# Patient Record
Sex: FEMALE | Race: WHITE | NOT HISPANIC OR LATINO | Employment: FULL TIME | URBAN - METROPOLITAN AREA
[De-identification: names, ages, dates, MRNs, and addresses within clinical notes are randomized per-mention and may not be internally consistent; named-entity substitution may affect disease eponyms.]

---

## 2022-07-08 ENCOUNTER — APPOINTMENT (OUTPATIENT)
Dept: GENERAL RADIOLOGY | Facility: CLINIC | Age: 62
DRG: 247 | End: 2022-07-08
Attending: PHYSICIAN ASSISTANT
Payer: COMMERCIAL

## 2022-07-08 ENCOUNTER — APPOINTMENT (OUTPATIENT)
Dept: CARDIOLOGY | Facility: CLINIC | Age: 62
DRG: 247 | End: 2022-07-08
Attending: INTERNAL MEDICINE
Payer: COMMERCIAL

## 2022-07-08 ENCOUNTER — HOSPITAL ENCOUNTER (INPATIENT)
Facility: CLINIC | Age: 62
LOS: 1 days | Discharge: HOME | DRG: 247 | End: 2022-07-09
Attending: PHYSICIAN ASSISTANT | Admitting: INTERNAL MEDICINE
Payer: COMMERCIAL

## 2022-07-08 DIAGNOSIS — Z98.61 POSTSURGICAL PERCUTANEOUS TRANSLUMINAL CORONARY ANGIOPLASTY STATUS: ICD-10-CM

## 2022-07-08 DIAGNOSIS — I21.4 NSTEMI (NON-ST ELEVATED MYOCARDIAL INFARCTION) (H): Primary | ICD-10-CM

## 2022-07-08 LAB
ACT BLD: 186 SECONDS (ref 74–150)
ACT BLD: 263 SECONDS (ref 74–150)
ACT BLD: 284 SECONDS (ref 74–150)
ACT BLD: 305 SECONDS (ref 74–150)
ANION GAP SERPL CALCULATED.3IONS-SCNC: 9 MMOL/L (ref 3–14)
ATRIAL RATE - MUSE: 78 BPM
BUN SERPL-MCNC: 12 MG/DL (ref 7–30)
CALCIUM SERPL-MCNC: 9.5 MG/DL (ref 8.5–10.1)
CHLORIDE BLD-SCNC: 104 MMOL/L (ref 94–109)
CHOLEST SERPL-MCNC: 240 MG/DL
CO2 SERPL-SCNC: 24 MMOL/L (ref 20–32)
CREAT SERPL-MCNC: 0.59 MG/DL (ref 0.52–1.04)
D DIMER PPP FEU-MCNC: 0.36 UG/ML FEU (ref 0–0.5)
DIASTOLIC BLOOD PRESSURE - MUSE: NORMAL MMHG
ERYTHROCYTE [DISTWIDTH] IN BLOOD BY AUTOMATED COUNT: 12.5 % (ref 10–15)
GFR SERPL CREATININE-BSD FRML MDRD: >90 ML/MIN/1.73M2
GLUCOSE BLD-MCNC: 124 MG/DL (ref 70–99)
HCT VFR BLD AUTO: 39.9 % (ref 35–47)
HDLC SERPL-MCNC: 100 MG/DL
HGB BLD-MCNC: 13.4 G/DL (ref 11.7–15.7)
INTERPRETATION ECG - MUSE: NORMAL
LDLC SERPL CALC-MCNC: 112 MG/DL
LVEF ECHO: NORMAL
MCH RBC QN AUTO: 31.5 PG (ref 26.5–33)
MCHC RBC AUTO-ENTMCNC: 33.6 G/DL (ref 31.5–36.5)
MCV RBC AUTO: 94 FL (ref 78–100)
NONHDLC SERPL-MCNC: 140 MG/DL
P AXIS - MUSE: -1 DEGREES
PLATELET # BLD AUTO: 298 10E3/UL (ref 150–450)
POTASSIUM BLD-SCNC: 4.1 MMOL/L (ref 3.4–5.3)
PR INTERVAL - MUSE: 142 MS
QRS DURATION - MUSE: 84 MS
QT - MUSE: 404 MS
QTC - MUSE: 460 MS
R AXIS - MUSE: 52 DEGREES
RBC # BLD AUTO: 4.26 10E6/UL (ref 3.8–5.2)
SARS-COV-2 RNA RESP QL NAA+PROBE: NEGATIVE
SODIUM SERPL-SCNC: 137 MMOL/L (ref 133–144)
SYSTOLIC BLOOD PRESSURE - MUSE: NORMAL MMHG
T AXIS - MUSE: 148 DEGREES
TRIGL SERPL-MCNC: 138 MG/DL
TROPONIN I SERPL HS-MCNC: 99 NG/L
VENTRICULAR RATE- MUSE: 78 BPM
WBC # BLD AUTO: 7 10E3/UL (ref 4–11)

## 2022-07-08 PROCEDURE — C1874 STENT, COATED/COV W/DEL SYS: HCPCS | Performed by: INTERNAL MEDICINE

## 2022-07-08 PROCEDURE — 85379 FIBRIN DEGRADATION QUANT: CPT | Performed by: PHYSICIAN ASSISTANT

## 2022-07-08 PROCEDURE — 250N000013 HC RX MED GY IP 250 OP 250 PS 637: Performed by: STUDENT IN AN ORGANIZED HEALTH CARE EDUCATION/TRAINING PROGRAM

## 2022-07-08 PROCEDURE — 96374 THER/PROPH/DIAG INJ IV PUSH: CPT

## 2022-07-08 PROCEDURE — 255N000002 HC RX 255 OP 636: Performed by: INTERNAL MEDICINE

## 2022-07-08 PROCEDURE — 250N000011 HC RX IP 250 OP 636: Performed by: INTERNAL MEDICINE

## 2022-07-08 PROCEDURE — 80048 BASIC METABOLIC PNL TOTAL CA: CPT | Performed by: PHYSICIAN ASSISTANT

## 2022-07-08 PROCEDURE — 250N000011 HC RX IP 250 OP 636: Performed by: PHYSICIAN ASSISTANT

## 2022-07-08 PROCEDURE — 84484 ASSAY OF TROPONIN QUANT: CPT | Performed by: PHYSICIAN ASSISTANT

## 2022-07-08 PROCEDURE — C9606 PERC D-E COR REVASC W AMI S: HCPCS | Performed by: INTERNAL MEDICINE

## 2022-07-08 PROCEDURE — 258N000003 HC RX IP 258 OP 636: Performed by: STUDENT IN AN ORGANIZED HEALTH CARE EDUCATION/TRAINING PROGRAM

## 2022-07-08 PROCEDURE — 250N000011 HC RX IP 250 OP 636: Performed by: STUDENT IN AN ORGANIZED HEALTH CARE EDUCATION/TRAINING PROGRAM

## 2022-07-08 PROCEDURE — 85027 COMPLETE CBC AUTOMATED: CPT | Performed by: PHYSICIAN ASSISTANT

## 2022-07-08 PROCEDURE — U0003 INFECTIOUS AGENT DETECTION BY NUCLEIC ACID (DNA OR RNA); SEVERE ACUTE RESPIRATORY SYNDROME CORONAVIRUS 2 (SARS-COV-2) (CORONAVIRUS DISEASE [COVID-19]), AMPLIFIED PROBE TECHNIQUE, MAKING USE OF HIGH THROUGHPUT TECHNOLOGIES AS DESCRIBED BY CMS-2020-01-R: HCPCS | Performed by: PHYSICIAN ASSISTANT

## 2022-07-08 PROCEDURE — C1725 CATH, TRANSLUMIN NON-LASER: HCPCS | Performed by: INTERNAL MEDICINE

## 2022-07-08 PROCEDURE — 93454 CORONARY ARTERY ANGIO S&I: CPT | Performed by: INTERNAL MEDICINE

## 2022-07-08 PROCEDURE — 99152 MOD SED SAME PHYS/QHP 5/>YRS: CPT | Performed by: INTERNAL MEDICINE

## 2022-07-08 PROCEDURE — 80061 LIPID PANEL: CPT | Performed by: STUDENT IN AN ORGANIZED HEALTH CARE EDUCATION/TRAINING PROGRAM

## 2022-07-08 PROCEDURE — 99221 1ST HOSP IP/OBS SF/LOW 40: CPT | Mod: 25 | Performed by: INTERNAL MEDICINE

## 2022-07-08 PROCEDURE — C1887 CATHETER, GUIDING: HCPCS | Performed by: INTERNAL MEDICINE

## 2022-07-08 PROCEDURE — B241ZZ3 ULTRASONOGRAPHY OF MULTIPLE CORONARY ARTERIES, INTRAVASCULAR: ICD-10-PCS | Performed by: INTERNAL MEDICINE

## 2022-07-08 PROCEDURE — 71046 X-RAY EXAM CHEST 2 VIEWS: CPT

## 2022-07-08 PROCEDURE — 272N000001 HC OR GENERAL SUPPLY STERILE: Performed by: INTERNAL MEDICINE

## 2022-07-08 PROCEDURE — 250N000013 HC RX MED GY IP 250 OP 250 PS 637: Performed by: INTERNAL MEDICINE

## 2022-07-08 PROCEDURE — 250N000013 HC RX MED GY IP 250 OP 250 PS 637: Performed by: PHYSICIAN ASSISTANT

## 2022-07-08 PROCEDURE — B2111ZZ FLUOROSCOPY OF MULTIPLE CORONARY ARTERIES USING LOW OSMOLAR CONTRAST: ICD-10-PCS | Performed by: INTERNAL MEDICINE

## 2022-07-08 PROCEDURE — 027034Z DILATION OF CORONARY ARTERY, ONE ARTERY WITH DRUG-ELUTING INTRALUMINAL DEVICE, PERCUTANEOUS APPROACH: ICD-10-PCS | Performed by: INTERNAL MEDICINE

## 2022-07-08 PROCEDURE — 999N000208 ECHOCARDIOGRAM COMPLETE

## 2022-07-08 PROCEDURE — 210N000002 HC R&B HEART CARE

## 2022-07-08 PROCEDURE — 99223 1ST HOSP IP/OBS HIGH 75: CPT | Mod: AI | Performed by: INTERNAL MEDICINE

## 2022-07-08 PROCEDURE — C1769 GUIDE WIRE: HCPCS | Performed by: INTERNAL MEDICINE

## 2022-07-08 PROCEDURE — C1760 CLOSURE DEV, VASC: HCPCS | Performed by: INTERNAL MEDICINE

## 2022-07-08 PROCEDURE — 93005 ELECTROCARDIOGRAM TRACING: CPT

## 2022-07-08 PROCEDURE — 92978 ENDOLUMINL IVUS OCT C 1ST: CPT | Performed by: INTERNAL MEDICINE

## 2022-07-08 PROCEDURE — 36415 COLL VENOUS BLD VENIPUNCTURE: CPT | Performed by: PHYSICIAN ASSISTANT

## 2022-07-08 PROCEDURE — 99153 MOD SED SAME PHYS/QHP EA: CPT | Performed by: INTERNAL MEDICINE

## 2022-07-08 PROCEDURE — 93010 ELECTROCARDIOGRAM REPORT: CPT | Performed by: INTERNAL MEDICINE

## 2022-07-08 PROCEDURE — 85347 COAGULATION TIME ACTIVATED: CPT

## 2022-07-08 PROCEDURE — 93306 TTE W/DOPPLER COMPLETE: CPT | Mod: 26 | Performed by: INTERNAL MEDICINE

## 2022-07-08 PROCEDURE — 99285 EMERGENCY DEPT VISIT HI MDM: CPT

## 2022-07-08 PROCEDURE — 250N000009 HC RX 250: Performed by: INTERNAL MEDICINE

## 2022-07-08 PROCEDURE — C9803 HOPD COVID-19 SPEC COLLECT: HCPCS

## 2022-07-08 DEVICE — CLOSURE ANGIOSEAL 6FR 610130: Type: IMPLANTABLE DEVICE | Status: FUNCTIONAL

## 2022-07-08 DEVICE — STENT CORONARY DES SYNERGY XD MR US 3.00X20MM H7493941820300: Type: IMPLANTABLE DEVICE | Status: FUNCTIONAL

## 2022-07-08 RX ORDER — GUANFACINE 1 MG/1
1 TABLET, EXTENDED RELEASE ORAL EVERY EVENING
COMMUNITY
Start: 2022-06-30

## 2022-07-08 RX ORDER — DEXTROAMPHETAMINE SACCHARATE, AMPHETAMINE ASPARTATE MONOHYDRATE, DEXTROAMPHETAMINE SULFATE AND AMPHETAMINE SULFATE 7.5; 7.5; 7.5; 7.5 MG/1; MG/1; MG/1; MG/1
1 CAPSULE, EXTENDED RELEASE ORAL EVERY MORNING
COMMUNITY
Start: 2022-06-16

## 2022-07-08 RX ORDER — LORAZEPAM 2 MG/ML
.5-1 INJECTION INTRAMUSCULAR EVERY 8 HOURS PRN
Status: DISCONTINUED | OUTPATIENT
Start: 2022-07-08 | End: 2022-07-09

## 2022-07-08 RX ORDER — OXYCODONE HYDROCHLORIDE 5 MG/1
5 TABLET ORAL EVERY 4 HOURS PRN
Status: DISCONTINUED | OUTPATIENT
Start: 2022-07-08 | End: 2022-07-09 | Stop reason: HOSPADM

## 2022-07-08 RX ORDER — ONDANSETRON 4 MG/1
4 TABLET, ORALLY DISINTEGRATING ORAL EVERY 6 HOURS PRN
Status: DISCONTINUED | OUTPATIENT
Start: 2022-07-08 | End: 2022-07-09

## 2022-07-08 RX ORDER — ATROPINE SULFATE 0.1 MG/ML
0.5 INJECTION INTRAVENOUS
Status: ACTIVE | OUTPATIENT
Start: 2022-07-08 | End: 2022-07-08

## 2022-07-08 RX ORDER — HEPARIN SODIUM 1000 [USP'U]/ML
INJECTION, SOLUTION INTRAVENOUS; SUBCUTANEOUS
Status: DISCONTINUED | OUTPATIENT
Start: 2022-07-08 | End: 2022-07-08 | Stop reason: HOSPADM

## 2022-07-08 RX ORDER — SODIUM CHLORIDE 9 MG/ML
INJECTION, SOLUTION INTRAVENOUS CONTINUOUS
Status: ACTIVE | OUTPATIENT
Start: 2022-07-08 | End: 2022-07-08

## 2022-07-08 RX ORDER — POTASSIUM CHLORIDE 1500 MG/1
20 TABLET, EXTENDED RELEASE ORAL
Status: CANCELLED | OUTPATIENT
Start: 2022-07-08

## 2022-07-08 RX ORDER — ASPIRIN 81 MG/1
81 TABLET ORAL DAILY
Status: DISCONTINUED | OUTPATIENT
Start: 2022-07-09 | End: 2022-07-09 | Stop reason: HOSPADM

## 2022-07-08 RX ORDER — OXYCODONE HYDROCHLORIDE 5 MG/1
10 TABLET ORAL EVERY 4 HOURS PRN
Status: DISCONTINUED | OUTPATIENT
Start: 2022-07-08 | End: 2022-07-09 | Stop reason: HOSPADM

## 2022-07-08 RX ORDER — ASPIRIN 81 MG/1
243 TABLET, CHEWABLE ORAL ONCE
Status: CANCELLED | OUTPATIENT
Start: 2022-07-08

## 2022-07-08 RX ORDER — ASPIRIN 325 MG
325 TABLET ORAL ONCE
Status: CANCELLED | OUTPATIENT
Start: 2022-07-08 | End: 2022-07-08

## 2022-07-08 RX ORDER — NALOXONE HYDROCHLORIDE 0.4 MG/ML
0.4 INJECTION, SOLUTION INTRAMUSCULAR; INTRAVENOUS; SUBCUTANEOUS
Status: ACTIVE | OUTPATIENT
Start: 2022-07-08 | End: 2022-07-08

## 2022-07-08 RX ORDER — LORAZEPAM 0.5 MG/1
0.5 TABLET ORAL
Status: CANCELLED | OUTPATIENT
Start: 2022-07-08

## 2022-07-08 RX ORDER — ASPIRIN 81 MG/1
81 TABLET, CHEWABLE ORAL DAILY
Qty: 30 TABLET | Refills: 3 | Status: SHIPPED | OUTPATIENT
Start: 2022-07-09 | End: 2022-07-09

## 2022-07-08 RX ORDER — FLUMAZENIL 0.1 MG/ML
0.2 INJECTION, SOLUTION INTRAVENOUS
Status: ACTIVE | OUTPATIENT
Start: 2022-07-08 | End: 2022-07-08

## 2022-07-08 RX ORDER — SODIUM CHLORIDE 9 MG/ML
INJECTION, SOLUTION INTRAVENOUS CONTINUOUS
Status: CANCELLED | OUTPATIENT
Start: 2022-07-08

## 2022-07-08 RX ORDER — HYDRALAZINE HYDROCHLORIDE 20 MG/ML
10 INJECTION INTRAMUSCULAR; INTRAVENOUS EVERY 4 HOURS PRN
Status: DISCONTINUED | OUTPATIENT
Start: 2022-07-08 | End: 2022-07-09 | Stop reason: HOSPADM

## 2022-07-08 RX ORDER — MOMETASONE FUROATE 1 MG/ML
SOLUTION TOPICAL DAILY PRN
COMMUNITY
Start: 2021-11-17

## 2022-07-08 RX ORDER — NITROGLYCERIN 20 MG/100ML
10-200 INJECTION INTRAVENOUS CONTINUOUS
Status: DISCONTINUED | OUTPATIENT
Start: 2022-07-08 | End: 2022-07-08 | Stop reason: CLARIF

## 2022-07-08 RX ORDER — ASPIRIN 81 MG/1
81 TABLET, CHEWABLE ORAL ONCE
Status: DISCONTINUED | OUTPATIENT
Start: 2022-07-08 | End: 2022-07-08

## 2022-07-08 RX ORDER — FENTANYL CITRATE 50 UG/ML
25 INJECTION, SOLUTION INTRAMUSCULAR; INTRAVENOUS
Status: DISCONTINUED | OUTPATIENT
Start: 2022-07-08 | End: 2022-07-09 | Stop reason: HOSPADM

## 2022-07-08 RX ORDER — ASPIRIN 81 MG/1
324 TABLET, CHEWABLE ORAL ONCE
Status: COMPLETED | OUTPATIENT
Start: 2022-07-08 | End: 2022-07-08

## 2022-07-08 RX ORDER — HEPARIN SODIUM 10000 [USP'U]/100ML
0-5000 INJECTION, SOLUTION INTRAVENOUS CONTINUOUS
Status: DISCONTINUED | OUTPATIENT
Start: 2022-07-08 | End: 2022-07-08

## 2022-07-08 RX ORDER — ZALEPLON 10 MG/1
1-2 CAPSULE ORAL
COMMUNITY
Start: 2022-06-30

## 2022-07-08 RX ORDER — GUANFACINE 2 MG/1
1 TABLET, EXTENDED RELEASE ORAL EVERY EVENING
COMMUNITY
Start: 2022-06-30

## 2022-07-08 RX ORDER — ONDANSETRON 2 MG/ML
4 INJECTION INTRAMUSCULAR; INTRAVENOUS EVERY 6 HOURS PRN
Status: DISCONTINUED | OUTPATIENT
Start: 2022-07-08 | End: 2022-07-09

## 2022-07-08 RX ORDER — ATORVASTATIN CALCIUM 80 MG/1
80 TABLET, FILM COATED ORAL DAILY
Qty: 90 TABLET | Refills: 3 | Status: SHIPPED | OUTPATIENT
Start: 2022-07-08 | End: 2022-07-09

## 2022-07-08 RX ORDER — ATORVASTATIN CALCIUM 80 MG/1
80 TABLET, FILM COATED ORAL EVERY EVENING
Status: DISCONTINUED | OUTPATIENT
Start: 2022-07-08 | End: 2022-07-09 | Stop reason: HOSPADM

## 2022-07-08 RX ORDER — NITROGLYCERIN 0.4 MG/1
0.4 TABLET SUBLINGUAL EVERY 5 MIN PRN
Status: DISCONTINUED | OUTPATIENT
Start: 2022-07-08 | End: 2022-07-09

## 2022-07-08 RX ORDER — LIDOCAINE 40 MG/G
CREAM TOPICAL
Status: CANCELLED | OUTPATIENT
Start: 2022-07-08

## 2022-07-08 RX ORDER — NALOXONE HYDROCHLORIDE 0.4 MG/ML
0.2 INJECTION, SOLUTION INTRAMUSCULAR; INTRAVENOUS; SUBCUTANEOUS
Status: ACTIVE | OUTPATIENT
Start: 2022-07-08 | End: 2022-07-08

## 2022-07-08 RX ORDER — LORAZEPAM 2 MG/ML
0.5 INJECTION INTRAMUSCULAR
Status: CANCELLED | OUTPATIENT
Start: 2022-07-08

## 2022-07-08 RX ORDER — NITROGLYCERIN 5 MG/ML
VIAL (ML) INTRAVENOUS
Status: DISCONTINUED | OUTPATIENT
Start: 2022-07-08 | End: 2022-07-08 | Stop reason: HOSPADM

## 2022-07-08 RX ORDER — DEXTROAMPHETAMINE SACCHARATE, AMPHETAMINE ASPARTATE, DEXTROAMPHETAMINE SULFATE AND AMPHETAMINE SULFATE 2.5; 2.5; 2.5; 2.5 MG/1; MG/1; MG/1; MG/1
1 TABLET ORAL DAILY PRN
COMMUNITY
Start: 2022-05-24

## 2022-07-08 RX ORDER — CARVEDILOL 3.12 MG/1
3.12 TABLET ORAL 2 TIMES DAILY WITH MEALS
Status: DISCONTINUED | OUTPATIENT
Start: 2022-07-08 | End: 2022-07-09

## 2022-07-08 RX ORDER — NITROGLYCERIN 0.4 MG/1
0.4 TABLET SUBLINGUAL EVERY 5 MIN PRN
Status: DISCONTINUED | OUTPATIENT
Start: 2022-07-08 | End: 2022-07-08

## 2022-07-08 RX ORDER — FENTANYL CITRATE 50 UG/ML
INJECTION, SOLUTION INTRAMUSCULAR; INTRAVENOUS
Status: DISCONTINUED | OUTPATIENT
Start: 2022-07-08 | End: 2022-07-08 | Stop reason: HOSPADM

## 2022-07-08 RX ORDER — ACETAMINOPHEN 325 MG/1
650 TABLET ORAL EVERY 4 HOURS PRN
Status: DISCONTINUED | OUTPATIENT
Start: 2022-07-08 | End: 2022-07-09

## 2022-07-08 RX ADMIN — HEPARIN SODIUM 800 UNITS/HR: 10000 INJECTION, SOLUTION INTRAVENOUS at 11:25

## 2022-07-08 RX ADMIN — ATORVASTATIN CALCIUM 80 MG: 80 TABLET, FILM COATED ORAL at 20:01

## 2022-07-08 RX ADMIN — NITROGLYCERIN 10 MCG/MIN: 20 INJECTION INTRAVENOUS at 21:19

## 2022-07-08 RX ADMIN — HUMAN ALBUMIN MICROSPHERES AND PERFLUTREN 9 ML: 10; .22 INJECTION, SOLUTION INTRAVENOUS at 16:32

## 2022-07-08 RX ADMIN — LORAZEPAM 0.5 MG: 2 INJECTION INTRAMUSCULAR; INTRAVENOUS at 23:44

## 2022-07-08 RX ADMIN — CARVEDILOL 3.12 MG: 3.12 TABLET, FILM COATED ORAL at 12:05

## 2022-07-08 RX ADMIN — SODIUM CHLORIDE: 9 INJECTION, SOLUTION INTRAVENOUS at 14:46

## 2022-07-08 RX ADMIN — ASPIRIN 81 MG CHEWABLE TABLET 324 MG: 81 TABLET CHEWABLE at 10:30

## 2022-07-08 RX ADMIN — OXYCODONE HYDROCHLORIDE 10 MG: 5 TABLET ORAL at 20:47

## 2022-07-08 RX ADMIN — HYDRALAZINE HYDROCHLORIDE 10 MG: 20 INJECTION INTRAMUSCULAR; INTRAVENOUS at 20:00

## 2022-07-08 RX ADMIN — HYDRALAZINE HYDROCHLORIDE 10 MG: 20 INJECTION INTRAMUSCULAR; INTRAVENOUS at 16:14

## 2022-07-08 RX ADMIN — TICAGRELOR 90 MG: 90 TABLET ORAL at 22:29

## 2022-07-08 RX ADMIN — NITROGLYCERIN 0.4 MG: 0.4 TABLET SUBLINGUAL at 11:22

## 2022-07-08 RX ADMIN — NITROGLYCERIN 0.4 MG: 0.4 TABLET SUBLINGUAL at 11:27

## 2022-07-08 RX ADMIN — OXYCODONE HYDROCHLORIDE 5 MG: 5 TABLET ORAL at 16:32

## 2022-07-08 RX ADMIN — CARVEDILOL 3.12 MG: 3.12 TABLET, FILM COATED ORAL at 17:40

## 2022-07-08 ASSESSMENT — ACTIVITIES OF DAILY LIVING (ADL)
ADLS_ACUITY_SCORE: 39
ADLS_ACUITY_SCORE: 35
ADLS_ACUITY_SCORE: 39

## 2022-07-08 ASSESSMENT — ENCOUNTER SYMPTOMS
RHINORRHEA: 0
NECK STIFFNESS: 1
FEVER: 0
COUGH: 0
VOMITING: 0
HEADACHES: 0
NECK PAIN: 1
SORE THROAT: 0
DIARRHEA: 0
NAUSEA: 0
ABDOMINAL PAIN: 0

## 2022-07-08 NOTE — PROVIDER NOTIFICATION
MD Notification    Notified Person: MD    Notified Person Name: Dr. Govea    Notification Date/Time: 7-8-22 1745    Notification Interaction: text page with [employer] messaging    Purpose of Notification: BP's 200/100.  pt tearful,anxious, states lots of home family stress with ill brother. Asking for any PRN anxiety meds    Orders Received: no anti anxiety at this time, was planning to increase coreg and give PRN hydralazine but after discussing with Dr. Garnica will use Nitroglycerin gtt for now and reassess tomorrow.     Comments:

## 2022-07-08 NOTE — Clinical Note
Stent deployed in the proximal left anterior descending. Max pressure = 14 gini. Total duration = 20 seconds.

## 2022-07-08 NOTE — PROVIDER NOTIFICATION
"MD Notification    Notified Person: MD    Notified Person Name: Dr. Garnica     Notification Date/Time: 7-8-22 1735    Notification Interaction: text page    Purpose of Notification: BP's 200/100's.  Given hydralazine PRN x1 with minimal change.  Pt states \"feels hard to take deep breath\" sats ok on room air.  Asking for any BP meds or PRN's    Orders Received: Will start IV nitroglycerin with goal of systolic < 150.    Comments:      "

## 2022-07-08 NOTE — H&P
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    Assessment & Plan       NSTEMI (non-ST elevated myocardial infarction) (H) (7/8/2022):   As above, patient presented with 3-day intermittent chest pain in the left chest area, radiating to the arm, jaw and subscapular area, also noticed it with exertion when she walked yesterday, associated with some diaphoresis and nausea.  She thinks the longest episode was 5 minutes yesterday.  Due to worsening of symptoms patient presented to ER this morning where she required administration of baby aspirin, 2 doses of nitroglycerin and EKG was concerning for T wave inversions in leads III and 4..  Troponin was elevated around 99 and cardiology was consulted in the emergency room.  Untreated hyperlipidemia  Possible undiagnosed essential hypertension versus whitecoat hypertension: In the past patient has been a started on metoprolol which she was not able to tolerate due to worsening depression    -- Admit patient to the Cardiology medical floor with telemetry for close monitoring   -- Start Patient on Aspirin 325 mg q. Daily  -- Heparin bolus and IV heparin infusion  -- Make patient NPO, Discussed the case with cardiology for tentative angiography and further evaluations.appreciate cardiology help   -- Started low dose of Carvedilol 3.125 mg BID as patient was not able to tolerate the Metoprolol in the past   -- Start patient on Lipitor 80 mg at bedtime   -- Daily weight  -- Strict Is and Os.  -- 2 D ECHO of the  "heart ASAP  -- Serial enzyme to be checked   -- Fasting Lipid profile in AM    Migraine headache : stable   -- Hold Adderrall while patient is in the hospital    Dysthymic disorder  Insomnia  -- Patient has been taking Sonata 1 to 2 capsules by mouth as needed, will order Ambien 5 mg as needed while patient is in the hospital.    Clinically Significant Risk Factors Present on Admission                 # Overweight: Estimated body mass index is 26.57 kg/m  as calculated from the following:    Height as of this encounter: 1.6 m (5' 3\").    Weight as of this encounter: 68 kg (150 lb).        Diet: NPO for Medical/Clinical Reasons Except for: Meds    Kirby Catheter: Not present  DVT Prophylaxis: Patient is currently on heparin infusion  Code Status: Full Code    Disposition: Expecting patient to stay for 1-2 nights, further discharge recommendations after angiogram results    The patient's care was discussed with the Bedside Nurse, Patient and ER Team.    Kylah Govea MD, MD,FACP    ----------------------------------------------------------------------------------------------------------------------    Primary Care Physician   [facility]    Chief Complaint   Chest pain , radiating to left arm , jaw and back intermittently for last three days     History is obtained from the patient    Patient Active Problem List   Diagnosis     NSTEMI (non-ST elevated myocardial infarction) (H)       History of Present Illness   Inez Benjamin is a 62 year old female , works as [employer] RN ,with past medical history significant for untreated dyslipidemia, possible whitecoat hypertension or undiagnosed essential hypertension who presented to emergency room now with 3 days of intermittent chest discomfort radiating to her left arm, jaw and subscapular area.  Patient also noticed associated diaphoresis and some dyspnea on exertion.  Patient is also undergoing significant stress due to family situation in the last few " days as her brother was involved in a motor vehicle accident and is currently admitted to [facility] in critical status.  Patient noticed development of chest pain when she walked to the hospital to visit her brother yesterday also.  Patient presented to emergency room for further evaluation and management this morning as her symptoms continue to get worse.    In the emergency department, patient temperature was 98.2, pulse was 75, blood pressure was 167/89, respiratory rate was 18 and she was saturating 100% on room air.  Her labs showed sodium of 137, creatinine of 0.59, WBC count of 7.0 D-dimer was 0.36 her troponin was elevated around 19 9 her COVID-19 PCR came back negative.  Chest x-ray was negative for any acute pathology.  Patient was given aspirin 81 mg and then 2 doses of nitroglycerin which helped her chest pain resolution.  Cardiology was also contacted from emergency room, highly appreciate their help, cardiology has discussed regarding concern for ACS due to the findings on EKG indicative of Wellens sign concerning for LAD territory abnormality.  Patient will be kept n.p.o., patient was also started on heparin infusion in the emergency room in the past patient had some more depression with metoprolol so cardiology is recommending starting patient on carvedilol.  Discussed with patient regarding probable need for echocardiogram, patient was in agreement with the plan and will be admitted for further evaluation and management.    Review of Systems   CONSTITUTIONAL:  positive for fatigue and generalized weakness for last three days.  EYES:  negative for blurred vision and visual disturbance  HEENT:  negative for hoarseness and voice change  RESPIRATORY:  Negative for cough with sputum, dyspnea and wheezing   CARDIOVASCULAR: As per [place]   GASTROINTESTINAL: Negative for abd pain, nausea , vomiting ,constipation and abdominal pain  GENITOURINARY: Negative for burning /urgency and  frequency.  HEMATOLOGIC/LYMPHATIC:  negative  ALLERGIC/IMMUNOLOGIC:  negative for drug reactions  ENDOCRINE:  negative for diabetic symptoms including polyuria, polydipsia and weight loss  MUSCULOSKELETAL:  negative for arthralgias  NEUROLOGICAL:  negative  BEHAVIOR/PSYCH: History of depression and dysthymia     Past Medical History    I have reviewed this patient's medical history and updated it with pertinent information if needed.   Patient has a history of dyslipidemia, migraine headache and dysthymic disorder    Past Surgical History   I have reviewed this patient's surgical history and updated it with pertinent information if needed.  Appendicectomy and     Prior to Admission Medications   Prior to Admission Medications   Prescriptions Last Dose Informant Patient Reported? Taking?   NONFORMULARY 2022 at AM Pharmacy Yes Yes   Sig: Apply 0.25 g topically every morning Custom compound: 4 mg BiEst (80 Estriol:20 Estradiol) and 2 mg testosterone per gram of compound.   (1 mg BiEst + 0.5 mg testosterone per dose)   NONFORMULARY 2022 at HS Pharmacy Yes Yes   Sig: Take 1 capsule by mouth At Bedtime Custom compound: 50 mg progesterone per capsule and olive oil   amphetamine-dextroamphetamine (ADDERALL XR) 30 MG 24 hr capsule 2022 at AM Self Yes Yes   Sig: Take 1 capsule by mouth every morning   amphetamine-dextroamphetamine (ADDERALL) 10 MG tablet 2022 at AM Self Yes Yes   Sig: Take 1 tablet by mouth daily as needed   guanFACINE (INTUNIV) 1 MG TB24 24 hr tablet 2022 at PM Self Yes Yes   Sig: Take 1 tablet by mouth every evening Take in addition to 2 mg tablet   guanFACINE (INTUNIV) 2 MG TB24 24 hr tablet 2022 at PM Self Yes Yes   Sig: Take 1 tablet by mouth every evening Take in addition to 1 mg tablet   mometasone (ELOCON) 0.1 % external solution 2022 at Unknown time Self Yes Yes   Sig: Apply topically daily as needed   zaleplon (SONATA) 10 MG capsule 2022 at HS Self Yes Yes    Sig: Take 1-2 capsules by mouth nightly as needed      Facility-Administered Medications: None     Allergies   Allergies   Allergen Reactions     Lisinopril Cough     Metoprolol Other (See Comments)       Social History   I have reviewed this patient's social history and updated it with pertinent information if needed.  Patient is started smoking at the age of 13 but quit at the age of 48, has not been smoking for the last 15 years.  Patient consumes 2 drinks of wine on a daily basis.  Patient is denying any illicit drug use.  Patient currently works as a ER RN at [employer].    Family History   Both the parents are , mother passed away from aneurysm rupture, no history of coronary artery disease in parents or siblings, currently brother is admitted to [facility] [facility] in critical situation due to being involved in motor vehicle accident    Physical Exam   Temp: 98.2  F (36.8  C) Temp src: Oral BP: (!) 169/87 Pulse: 80   Resp: 16 SpO2: 100 % O2 Device: None (Room air)    Vital Signs with Ranges  Temp:  [98.2  F (36.8  C)] 98.2  F (36.8  C)  Pulse:  [] 80  Resp:  [15-18] 16  BP: (155-190)/() 169/87  SpO2:  [98 %-100 %] 100 %  150 lbs 0 oz    Constitutional: Awake, alert,oriented to time, place and person , cooperative, no apparent distress.Pleasent and cooperative , slightly anxious  Eyes: Conjunctiva and pupils examined and normal.  HEENT: Moist mucous membranes, normal dentition.  Respiratory: Clear to auscultation bilaterally, no crackles or wheezing.  Cardiovascular: Regular rate and rhythm, normal S1 and S2, and no murmur noted.  GI: Soft, non-distended, non-tender, normal bowel sounds.  Lymph/Hematologic: No anterior cervical or supraclavicular adenopathy.  Skin: No rashes, no cyanosis, no edema.  Musculoskeletal: No joint swelling, erythema or tenderness.  Neurologic: Cranial nerves 2-12 intact, normal strength and sensation.  Psychiatric: Alert, oriented to person, place and  time, no obvious anxiety or depression.    Data   Data reviewed today:  I personally reviewed EKG which is showing normal sinus rhythm with T wave abnormality in lead III and 4    Recent Labs   Lab 07/08/22  1005   WBC 7.0   HGB 13.4   MCV 94         POTASSIUM 4.1   CHLORIDE 104   CO2 24   BUN 12   CR 0.59   ANIONGAP 9   CELESTINA 9.5   *       Imaging:  Recent Results (from the past 24 hour(s))   Chest XR,  PA & LAT    Narrative    CHEST TWO VIEWS  7/8/2022 11:23 AM     HISTORY:  Chest pain.    COMPARISON: None.      Impression    IMPRESSION: Negative chest. Lungs clear.    BANDAR NAYLOR MD         SYSTEM ID:  [id number]

## 2022-07-08 NOTE — Clinical Note
The first balloon was inserted into the left anterior descending.Max pressure = 16 gini. Total duration = 30 seconds.     Max pressure = 16 gini. Total duration = 30 seconds.

## 2022-07-08 NOTE — CONSULTS
[facility]    Cardiology Consultation     Date of Admission:  7/8/2022    Assessment & Plan   Inez Benjamin is a 62 year old female who was admitted on 7/8/2022.    A very pleasant 62-year-old female with CAD risk factors untreated dyslipidemia with LDL more than 160 in the past who is now admitted with the 3 days of intermittent chest discomfort feels like a deep ache tightness wrapping around her scapula along with EKG changes and elevated high since her troponin all consistent with acute coronary syndrome.  Somewhat remarkable is that there is somewhat discrepancy between the EKG changes and troponin elevation especially with 3 days of intermittent symptoms.  This could also possibly be stress cardiomyopathy however that is a diagnosis of exclusion and at this time the most concerning etiology is acute coronary syndrome with Wellens sign on EKG indicating possibility of ischemia in LAD territory.  I did discuss this with patient.  We discussed options of medical management versus medical management plus coronary angiogram.  Risk benefits of core angiogram with risk including but not limited risk of stroke, MI, death, need for PRBC transfusion, emergent bypass surgery were discussed with patient.  Patient understands the rational of cholangiogram, the risk involved and the alternative and wishes to proceed with it.  I also called Cath Lab to get the code angiogram done on an urgent basis.  Patient will continue NPO.  Continue aspirin, heparin.  I recommend adding Lipitor 80 mg daily.  She has allergy listed to metoprolol and lisinopril.  With lisinopril she had cough.  With metoprolol she had some mood depression.  Patient is willing to try an alternative beta-blocker.  I recommend adding carvedilol 3.125 mg twice daily.  This will also help bring down her blood pressure too.  I also recommend echocardiogram.  No bleeding issues or anticipated surgery.  No contraindication for  long-term dual antiplatelet if indicated.    1.  Chest discomfort with the EKG changes and elevated troponin concerning for acute coronary syndrome.  Symptoms are going intermittently for last 2 to 3 days.  Recurrent episode of angina.  EKG concerning for Wellens sign indicating possibility of LAD territory ischemia.  Other differential includes stress cardiomyopathy as noted above.  To be noted patient has been under a lot of stress recently as his brother had a major motor vehicle accident and is currently admitted at [facility].  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  2.  Elevated blood pressure.  This needs to be observed.  She has does not have history of hypertension.  3.  Dyslipidemia with LDL above 160 in the past.      Recommendations  Coronary angiogram with possible revascularization.  I have contacted Cath Lab so that the angiogram can be done on an urgent basis.  Echocardiogram  Continue aspirin, heparin  Add Lipitor 80 mg daily, carvedilol 3.125 mg twice daily  Continue n.p.o.    Discussed in detail with patient      Jamel Garnica MD, MD    Primary Care Physician   [facility]    Reason for Consult   Reason for consult: I was asked by Dr. Govea to evaluate this patient for non-STEMI.    History of Present Illness   Inez Benjamin is a 62 year old female who presents with intermittent chest discomfort for last 2 to 3 days.  She describes this as achy sensation deep inside moderate to severe intensity sometimes radiating to the scapula and upper chest.  She has been under a lot of stress recently.  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  Her brother recently had a major motor vehicle accident and is currently recovering at [employer] [facility] [employer].  EKG shows what appears to be Wellens sign T wave inversion in anterior precordial leads.  High since her troponin is 99.  No mediastinal widening on chest x-ray.  She does not have any bleeding issues or anticipated surgery.  She  has history of allergy listed to metoprolol and lisinopril with metoprolol causing some mood changes depression and lisinopril causing cough.  Lipid panel in Care Everywhere shows LDL was more than 160.  She had recurrent episode of chest discomfort while in the ER and was given 2 sublingual nitroglycerin with complete resolution of symptoms.    Patient Active Problem List   Diagnosis     NSTEMI (non-ST elevated myocardial infarction) (H)       Past Medical History   I have reviewed this patient's medical history and updated it with pertinent information if needed.   No past medical history on file.    Past Surgical History   I have reviewed this patient's surgical history and updated it with pertinent information if needed.  No past surgical history on file.    Prior to Admission Medications   Prior to Admission Medications   Prescriptions Last Dose Informant Patient Reported? Taking?   NONFORMULARY 7/7/2022 at AM Pharmacy Yes Yes   Sig: Apply 0.25 g topically every morning Custom compound: 4 mg BiEst (80 Estriol:20 Estradiol) and 2 mg testosterone per gram of compound.   (1 mg BiEst + 0.5 mg testosterone per dose)   NONFORMULARY 7/7/2022 at HS Pharmacy Yes Yes   Sig: Take 1 capsule by mouth At Bedtime Custom compound: 50 mg progesterone per capsule and olive oil   amphetamine-dextroamphetamine (ADDERALL XR) 30 MG 24 hr capsule 7/8/2022 at AM Self Yes Yes   Sig: Take 1 capsule by mouth every morning   amphetamine-dextroamphetamine (ADDERALL) 10 MG tablet 7/6/2022 at AM Self Yes Yes   Sig: Take 1 tablet by mouth daily as needed   guanFACINE (INTUNIV) 1 MG TB24 24 hr tablet 7/7/2022 at PM Self Yes Yes   Sig: Take 1 tablet by mouth every evening Take in addition to 2 mg tablet   guanFACINE (INTUNIV) 2 MG TB24 24 hr tablet 7/7/2022 at PM Self Yes Yes   Sig: Take 1 tablet by mouth every evening Take in addition to 1 mg tablet   mometasone (ELOCON) 0.1 % external solution 7/7/2022 at Unknown time Self Yes Yes   Sig:  "Apply topically daily as needed   zaleplon (SONATA) 10 MG capsule 7/7/2022 at HS Self Yes Yes   Sig: Take 1-2 capsules by mouth nightly as needed      Facility-Administered Medications: None     Current Facility-Administered Medications   Medication Dose Route Frequency     atorvastatin  80 mg Oral QPM     carvedilol  3.125 mg Oral BID w/meals     Current Facility-Administered Medications   Medication Last Rate     heparin 800 Units/hr (07/08/22 1125)     Allergies   Allergies   Allergen Reactions     Lisinopril Cough     Metoprolol Other (See Comments)       Social History        Family History   No family history of known premature coronary disease in first-degree relatives    Review of Systems   The comprehensive 10 point Review of Systems is negative other than noted in the HPI or here.     Physical Exam   Vital Signs with Ranges  Temp:  [98.2  F (36.8  C)] 98.2  F (36.8  C)  Pulse:  [] 92  Resp:  [18] 18  BP: (155-190)/() 155/92  SpO2:  [98 %-100 %] 100 %  Wt Readings from Last 4 Encounters:   07/08/22 68 kg (150 lb)     No intake/output data recorded.      Vitals: BP (!) 155/92   Pulse 92   Temp 98.2  F (36.8  C) (Oral)   Resp 18   Ht 1.6 m (5' 3\")   Wt 68 kg (150 lb)   SpO2 100%   BMI 26.57 kg/m    General patient appears comfortable  Neck normal JVP next cardiovascular system S1-S2 normal no murmur rub or gallop, equal pulses bilateral upper extremities  Neurological alert, oriented  GI system abdomen soft nontender  Extremities no edema  HEENT no pallor  Psych normal affect  Skin no obvious rash    No lab results found in last 7 days.    Invalid input(s): TROPONINIES    Recent Labs   Lab 07/08/22  1005   WBC 7.0   HGB 13.4   MCV 94         POTASSIUM 4.1   CHLORIDE 104   CO2 24   BUN 12   CR 0.59   GFRESTIMATED >90   ANIONGAP 9   CELESTINA 9.5   *     No results for input(s): CHOL, HDL, LDL, TRIG, CHOLHDLRATIO in the last 29134 hours.  Recent Labs   Lab 07/08/22  1005   WBC " "7.0   HGB 13.4   HCT 39.9   MCV 94        No results for input(s): PH, PHV, PO2, PO2V, SAT, PCO2, PCO2V, HCO3, HCO3V in the last 168 hours.  No results for input(s): NTBNPI, NTBNP in the last 168 hours.  Recent Labs   Lab 07/08/22  1029   DD 0.36     No results for input(s): SED, CRP in the last 168 hours.  Recent Labs   Lab 07/08/22  1005        No results for input(s): TSH in the last 168 hours.  No results for input(s): COLOR, APPEARANCE, URINEGLC, URINEBILI, URINEKETONE, SG, UBLD, URINEPH, PROTEIN, UROBILINOGEN, NITRITE, LEUKEST, RBCU, WBCU in the last 168 hours.    Imaging:  Recent Results (from the past 48 hour(s))   Chest XR,  PA & LAT    Narrative    CHEST TWO VIEWS  7/8/2022 11:23 AM     HISTORY:  Chest pain.    COMPARISON: None.      Impression    IMPRESSION: Negative chest. Lungs clear.    BANDAR NAYLOR MD         SYSTEM ID:  [id number]       Echo:  No results found for this or any previous visit (from the past 4320 hour(s)).    Clinically Significant Risk Factors Present on Admission                 # Overweight: Estimated body mass index is 26.57 kg/m  as calculated from the following:    Height as of this encounter: 1.6 m (5' 3\").    Weight as of this encounter: 68 kg (150 lb).                                                    "

## 2022-07-08 NOTE — ED PROVIDER NOTES
ED ATTENDING PHYSICIAN NOTE:   I evaluated this patient in conjunction with Santhosh Stout PA-C  I have participated in the care of the patient and personally performed key elements of the history, exam, and medical decision making.      HPI:   Inez Benjamin is a 62 year old female with chest pain and neck pain for the past three days. She explains that the chest pain radiates around her chest and into her neck causing neck stiffness, but symptoms typically resolve after a few hours. She denies any headache, runny nose, sore throat, cough, fever, abdominal pain, nausea, vomiting, or diarrhea.      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 She noticed it in her front and it radiates to her back and neck. Sometimes she feels light headed with this. Activity sometimes makes it worse. H does have hypertension and high cholesterol without treatment. There is no first degree family history of heart disease and she is not a smoker. She has been under stress thought due to her brother having a significant injury a few weeks ago. She was seen by Santhosh SUAREZ, her EKG demonstrated some nonspecific ST elevation in leads P1-3 of approximately 0.5 mm with some deep T wave inversion across the precordial leads. Her troponin has come back minimally elevated and her pain is still intermittent. She has received aspirin and is receiving nitroglycerin started on heparin. She is getting a chest X ray and will be admitted. We will also contact cardiology to determine weather or not she would be a good candidate to go to the cath lab at this time. Impression is NSTEMI and irritation.     DIAGNOSIS:     ICD-10-CM    1.  Postsurgical percutaneous transluminal coronary angioplasty status  Z98.61 CARDIAC REHAB REFERRAL     aspirin (ASA) 81 MG chewable tablet     atorvastatin (LIPITOR) 80 MG tablet   2. NSTEMI (non-ST elevated myocardial infarction) (H)  I21.4 Case Request Cath Lab: Coronary Angiogram, Percutaneous Coronary Intervention     Case Request Cath Lab: Coronary Angiogram, Percutaneous Coronary Intervention     Cardiac Catheterization     Cardiac Catheterization     CARDIAC REHAB REFERRAL     aspirin (ASA) 81 MG chewable tablet     atorvastatin (LIPITOR) 80 MG tablet            DISPOSITION:   admit       7/8/2022  [facility] EMERGENCY DEPT       Ha Baker MD  07/08/22 0231

## 2022-07-08 NOTE — PROGRESS NOTES
A/Ox4. SBP 150s-200s, other VS stable on RA. Tele NSR. SBA in room. Angio 7/8 with intervention, site WDL. C/o tenderness at site. Off at bedrest 1715. Pt periodically tearful and anxious, expressed difficulty to take deep breath, sats well on RA. Started on nitrodrip to manage BP.

## 2022-07-08 NOTE — ED PROVIDER NOTES
History   Chief Complaint:  Chest Pain       HPI   Inez Benjamin is a 62 year old female with history of depression who presents with chest pain and neck pain for the past three days. She explains that the chest pain radiates around her chest and into her neck causing neck stiffness, but symptoms typically resolve after a few hours. She reports she believes stress increases frequency of episodes. She explains today's episode started about an hour ago and has not resolved. She reports she thought it was muscular pain and tried rolling it out which did not help. Also reports using ibuprofen and Tums which have not helped alleviate pain.   Denies headache, runny nose, sore throat, cough, fever, abdominal pain, nausea, vomiting, diarrhea. Endorses diaphoresis at baseline. Denies recent surgeries, travel, history of blood clots, cancer, or heart problems.        Review of Systems   Constitutional: Negative for fever.   HENT: Negative for rhinorrhea and sore throat.    Respiratory: Negative for cough.    Cardiovascular: Positive for chest pain.   Gastrointestinal: Negative for abdominal pain, diarrhea, nausea and vomiting.   Musculoskeletal: Positive for neck pain and neck stiffness.   Neurological: Negative for headaches.   All other systems reviewed and are negative.    Allergies:  Lisinopril  Metoprolol    Medications:  Adderall  Sonata    Past Medical History:     Dysmenorrhea  Depression   Rosacea  Dysthymic disorder    Social History:  The patient presents to the ED alone.  PCP: Rebecca Cano at [facility].    Physical Exam     Patient Vitals for the past 24 hrs:   BP Temp Temp src Pulse Resp SpO2 Height Weight   07/08/22 1356 -- -- -- -- 18 -- -- --   07/08/22 1346 -- -- -- -- 19 -- -- --   07/08/22 1335 -- -- -- -- 17 -- -- --   07/08/22 1325 -- -- -- -- 17 -- -- --   07/08/22 1315 -- -- -- -- 17 -- -- --   07/08/22 1304 -- -- -- -- 16 -- -- --   07/08/22 1253 -- -- -- -- 15 -- -- --   07/08/22 1243 -- -- -- --  "16 -- -- --   07/08/22 1231 -- -- -- -- 15 -- -- --   07/08/22 1200 (!) 169/87 -- -- 80 -- 100 % -- --   07/08/22 1155 (!) 161/86 -- -- 85 -- 100 % -- --   07/08/22 1150 (!) 177/101 -- -- 88 -- 100 % -- --   07/08/22 1145 (!) 155/92 -- -- 92 -- 100 % -- --   07/08/22 1140 (!) 161/89 -- -- 89 -- 100 % -- --   07/08/22 1135 (!) 164/98 -- -- 104 -- 98 % -- --   07/08/22 1130 (!) 187/115 -- -- 100 -- 100 % -- --   07/08/22 1125 (!) 190/90 -- -- 78 -- -- -- --   07/08/22 1120 (!) 188/113 -- -- -- -- -- -- --   07/08/22 0956 (!) 167/89 98.2  F (36.8  C) Oral 75 18 100 % 1.6 m (5' 3\") 68 kg (150 lb)       Physical Exam  General: Well appearing, pleasant female, resting on exam bed  HEENT: No evidence of trauma.  Conjunctive are clear.  Extraocular eye movements intact.  Neck range of motion intact.  Nose and throat clear.  Respiratory: Good breath sounds bilaterally  Cardiovascular: Normal rate and rhythm   Gastrointestinal: Soft, nontender.   Musculoskeletal: Atraumatic  Skin: Exposed skin clear.  Neurologic: Alert.  Psych:  Patient is cooperative, with normal affect.      Emergency Department Course   ECG  ECG taken at 1003, ECG read at 1005  Normal sinus rhythm  Anterior infarct, age undetermined  T wave abnormality, consider lateral ischemia  Abnormal ECG  Rate 78 bpm. FL interval 142 ms. QRS duration 84 ms. QT/QTc 404/460 ms. P-R-T axes -1 52 148.    Imaging:  Chest XR,  PA & LAT   Final Result   IMPRESSION: Negative chest. Lungs clear.      BANDAR NAYLOR MD            SYSTEM ID:  [id number]      Cardiac Catheterization    (Results Pending)   Echocardiogram Complete    (Results Pending)     Report per radiology    Laboratory:  Labs Ordered and Resulted from Time of ED Arrival to Time of ED Departure   BASIC METABOLIC PANEL - Abnormal       Result Value    Sodium 137      Potassium 4.1      Chloride 104      Carbon Dioxide (CO2) 24      Anion Gap 9      Urea Nitrogen 12      Creatinine 0.59      Calcium 9.5      " Glucose 124 (*)     GFR Estimate >90     TROPONIN I - Abnormal    Troponin I High Sensitivity 99 (*)    CBC WITH PLATELETS - Normal    WBC Count 7.0      RBC Count 4.26      Hemoglobin 13.4      Hematocrit 39.9      MCV 94      MCH 31.5      MCHC 33.6      RDW 12.5      Platelet Count 298     D DIMER QUANTITATIVE - Normal    D-Dimer Quantitative 0.36          Emergency Department Course:         Reviewed:  I reviewed nursing notes, vitals, past medical history and Care Everywhere    Assessments:  1009 I obtained history and examined the patient as noted above.   1105 I rechecked the patient and explained findings.     Consults:  1110 I consulted with Dr. Aguiar, cardiology, regarding the patient.   1130 I consulted with Dr. Govea, hospitalist, regarding the patient.     Interventions:  Medications   nitroGLYcerin (NITROSTAT) sublingual tablet 0.4 mg (0.4 mg Sublingual Given 7/8/22 1127)   heparin 25,000 units in 0.45% NaCl 250 mL ANTICOAGULANT infusion (800 Units/hr Intravenous New Bag 7/8/22 1125)   atorvastatin (LIPITOR) tablet 80 mg (has no administration in time range)   carvedilol (COREG) tablet 3.125 mg (3.125 mg Oral Given 7/8/22 1205)   fentaNYL (PF) (SUBLIMAZE) injection (50 mcg Intravenous Given 7/8/22 1236)   midazolam (VERSED) injection (0.5 mg Intravenous Given 7/8/22 1343)   lidocaine 1 % (10 mLs Infiltration Given 7/8/22 1236)   heparin (porcine) injection (3,000 Units Intravenous Given 7/8/22 1328)   nitroGLYcerin in D5W injection (200 mcg INTRACORONARY Given 7/8/22 1335)   ticagrelor (BRILINTA) tablet (180 mg Oral Given 7/8/22 1331)   aspirin (ASA) chewable tablet 324 mg (324 mg Oral Given 7/8/22 1030)   heparin loading dose for LOW INTENSITY TREATMENT * Give BEFORE starting heparin infusion (4,100 Units Intravenous Given 7/8/22 1124)      Disposition:  The patient was admitted to the hospital under the care of Dr. Govea, hospitalist.     Impression & Plan     Medical Decision Making:  Inez YANG  Sourav is a 62 year old female presents emergency room today for evaluation of chest pain intermittently for the past 3 days.  See HPI.  She is hypertensive.  She has significant T wave changes.  See EKG.  She has a reassuring exam and is in no acute distress.  She was given aspirin.  Her troponin came back elevated at 99.  Labs otherwise reassuring.  Chest radiograph unremarkable.  At this point, I staffed the case with Dr. Reynoso.  Nitroglycerin and heparin are ordered.  Cardiology was consulted.  She will be admitted to the hospital service and kept n.p.o.  She was stable at time of admission.  Unlikely dissection or PE.    Diagnosis:    ICD-10-CM    1. NSTEMI (non-ST elevated myocardial infarction) (H)  I21.4 Case Request Cath Lab: Coronary Angiogram, Percutaneous Coronary Intervention     Case Request Cath Lab: Coronary Angiogram, Percutaneous Coronary Intervention     Cardiac Catheterization     Cardiac Catheterization       Scribe Disclosure:  Gris MCCLENDON, am serving as a scribe at 10:09 AM on 7/8/2022 to document services personally performed by Santhosh Stout PA-C based on my observations and the provider's statements to me.          Santhosh Stout PA-C  07/08/22 1400

## 2022-07-08 NOTE — PHARMACY-ADMISSION MEDICATION HISTORY
Pharmacy Medication History  Admission medication history interview status for the 7/8/2022  admission is complete. See EPIC admission navigator for prior to admission medications     Location of Interview: Patient room  Medication history sources: Patient, Surescripts, Pharmacy [facility] and Care Everywhere    Significant changes made to the medication list:  1. Added 2 custom compounds, zaleplon, guanfacine x2, Adderall, Adderall XR, mometasone    In the past week, patient estimated taking medication this percent of the time: greater than 90%    Additional medication history information:   1. Contents of custom compounds were confirmed by compounding pharmacy.   2. Patient reports she is not taking clobetasol.  3. Patient takes Adderall XR 30 mg (scheduled) and PRN Adderall 10 mg. She also takes guanfacine ER 1 mg and ER 2 mg.      Medication reconciliation completed by provider prior to medication history? No    Time spent in this activity: 20 min    Prior to Admission medications    Medication Sig Last Dose Taking? Auth Provider Long Term End Date   amphetamine-dextroamphetamine (ADDERALL XR) 30 MG 24 hr capsule Take 1 capsule by mouth every morning 7/8/2022 at AM Yes Unknown, Entered By History     amphetamine-dextroamphetamine (ADDERALL) 10 MG tablet Take 1 tablet by mouth daily as needed 7/6/2022 at AM Yes Unknown, Entered By History     guanFACINE (INTUNIV) 1 MG TB24 24 hr tablet Take 1 tablet by mouth every evening Take in addition to 2 mg tablet 7/7/2022 at PM Yes Unknown, Entered By History     guanFACINE (INTUNIV) 2 MG TB24 24 hr tablet Take 1 tablet by mouth every evening Take in addition to 1 mg tablet 7/7/2022 at PM Yes Unknown, Entered By History     mometasone (ELOCON) 0.1 % external solution Apply topically daily as needed 7/7/2022 at Unknown time Yes Unknown, Entered By History     NONFORMULARY Apply 0.25 g topically every morning Custom compound: 4 mg BiEst (80 Estriol:20 Estradiol) and  2 mg testosterone per gram of compound.   (1 mg BiEst + 0.5 mg testosterone per dose) 7/7/2022 at AM Yes Unknown, Entered By History     NONFORMULARY Take 1 capsule by mouth At Bedtime Custom compound: 50 mg progesterone per capsule and olive oil 7/7/2022 at HS Yes Unknown, Entered By History     zaleplon (SONATA) 10 MG capsule Take 1-2 capsules by mouth nightly as needed 7/7/2022 at HS Yes Unknown, Entered By History         The information provided in this note is only as accurate as the sources available at the time of update(s)

## 2022-07-09 ENCOUNTER — APPOINTMENT (OUTPATIENT)
Dept: OCCUPATIONAL THERAPY | Facility: CLINIC | Age: 62
DRG: 247 | End: 2022-07-09
Attending: STUDENT IN AN ORGANIZED HEALTH CARE EDUCATION/TRAINING PROGRAM
Payer: COMMERCIAL

## 2022-07-09 VITALS
BODY MASS INDEX: 26.22 KG/M2 | HEIGHT: 63 IN | TEMPERATURE: 97.8 F | HEART RATE: 80 BPM | WEIGHT: 148 LBS | RESPIRATION RATE: 16 BRPM | OXYGEN SATURATION: 100 % | SYSTOLIC BLOOD PRESSURE: 136 MMHG | DIASTOLIC BLOOD PRESSURE: 80 MMHG

## 2022-07-09 LAB
ANION GAP SERPL CALCULATED.3IONS-SCNC: 6 MMOL/L (ref 3–14)
BUN SERPL-MCNC: 9 MG/DL (ref 7–30)
CALCIUM SERPL-MCNC: 8.7 MG/DL (ref 8.5–10.1)
CHLORIDE BLD-SCNC: 106 MMOL/L (ref 94–109)
CO2 SERPL-SCNC: 27 MMOL/L (ref 20–32)
CREAT SERPL-MCNC: 0.53 MG/DL (ref 0.52–1.04)
GFR SERPL CREATININE-BSD FRML MDRD: >90 ML/MIN/1.73M2
GLUCOSE BLD-MCNC: 101 MG/DL (ref 70–99)
MAGNESIUM SERPL-MCNC: 2.4 MG/DL (ref 1.6–2.3)
POTASSIUM BLD-SCNC: 3.6 MMOL/L (ref 3.4–5.3)
POTASSIUM BLD-SCNC: 3.8 MMOL/L (ref 3.4–5.3)
SODIUM SERPL-SCNC: 139 MMOL/L (ref 133–144)

## 2022-07-09 PROCEDURE — 93010 ELECTROCARDIOGRAM REPORT: CPT | Performed by: INTERNAL MEDICINE

## 2022-07-09 PROCEDURE — 84132 ASSAY OF SERUM POTASSIUM: CPT | Performed by: INTERNAL MEDICINE

## 2022-07-09 PROCEDURE — 36415 COLL VENOUS BLD VENIPUNCTURE: CPT | Performed by: INTERNAL MEDICINE

## 2022-07-09 PROCEDURE — 250N000013 HC RX MED GY IP 250 OP 250 PS 637: Performed by: STUDENT IN AN ORGANIZED HEALTH CARE EDUCATION/TRAINING PROGRAM

## 2022-07-09 PROCEDURE — 99239 HOSP IP/OBS DSCHRG MGMT >30: CPT | Performed by: INTERNAL MEDICINE

## 2022-07-09 PROCEDURE — 97110 THERAPEUTIC EXERCISES: CPT | Mod: GO

## 2022-07-09 PROCEDURE — 82310 ASSAY OF CALCIUM: CPT | Performed by: STUDENT IN AN ORGANIZED HEALTH CARE EDUCATION/TRAINING PROGRAM

## 2022-07-09 PROCEDURE — 99232 SBSQ HOSP IP/OBS MODERATE 35: CPT | Performed by: INTERNAL MEDICINE

## 2022-07-09 PROCEDURE — 250N000013 HC RX MED GY IP 250 OP 250 PS 637: Performed by: INTERNAL MEDICINE

## 2022-07-09 PROCEDURE — 97165 OT EVAL LOW COMPLEX 30 MIN: CPT | Mod: GO

## 2022-07-09 PROCEDURE — 97535 SELF CARE MNGMENT TRAINING: CPT | Mod: GO

## 2022-07-09 PROCEDURE — 83735 ASSAY OF MAGNESIUM: CPT | Performed by: INTERNAL MEDICINE

## 2022-07-09 PROCEDURE — 93005 ELECTROCARDIOGRAM TRACING: CPT

## 2022-07-09 PROCEDURE — 36415 COLL VENOUS BLD VENIPUNCTURE: CPT | Performed by: STUDENT IN AN ORGANIZED HEALTH CARE EDUCATION/TRAINING PROGRAM

## 2022-07-09 RX ORDER — LISINOPRIL 5 MG/1
5 TABLET ORAL DAILY
Status: DISCONTINUED | OUTPATIENT
Start: 2022-07-09 | End: 2022-07-09

## 2022-07-09 RX ORDER — PROCHLORPERAZINE 25 MG
25 SUPPOSITORY, RECTAL RECTAL EVERY 12 HOURS PRN
Status: DISCONTINUED | OUTPATIENT
Start: 2022-07-09 | End: 2022-07-09 | Stop reason: HOSPADM

## 2022-07-09 RX ORDER — NALOXONE HYDROCHLORIDE 0.4 MG/ML
0.4 INJECTION, SOLUTION INTRAMUSCULAR; INTRAVENOUS; SUBCUTANEOUS
Status: DISCONTINUED | OUTPATIENT
Start: 2022-07-09 | End: 2022-07-09 | Stop reason: HOSPADM

## 2022-07-09 RX ORDER — CARVEDILOL 6.25 MG/1
6.25 TABLET ORAL 2 TIMES DAILY WITH MEALS
Status: DISCONTINUED | OUTPATIENT
Start: 2022-07-09 | End: 2022-07-09 | Stop reason: HOSPADM

## 2022-07-09 RX ORDER — LOSARTAN POTASSIUM 25 MG/1
25 TABLET ORAL DAILY
Qty: 30 TABLET | Refills: 0 | Status: SHIPPED | OUTPATIENT
Start: 2022-07-09

## 2022-07-09 RX ORDER — LIDOCAINE 40 MG/G
CREAM TOPICAL
Status: DISCONTINUED | OUTPATIENT
Start: 2022-07-09 | End: 2022-07-09 | Stop reason: HOSPADM

## 2022-07-09 RX ORDER — ONDANSETRON 2 MG/ML
4 INJECTION INTRAMUSCULAR; INTRAVENOUS EVERY 6 HOURS PRN
Status: DISCONTINUED | OUTPATIENT
Start: 2022-07-09 | End: 2022-07-09 | Stop reason: HOSPADM

## 2022-07-09 RX ORDER — ONDANSETRON 4 MG/1
4 TABLET, ORALLY DISINTEGRATING ORAL EVERY 6 HOURS PRN
Status: DISCONTINUED | OUTPATIENT
Start: 2022-07-09 | End: 2022-07-09 | Stop reason: HOSPADM

## 2022-07-09 RX ORDER — OXYCODONE HYDROCHLORIDE 5 MG/1
5 TABLET ORAL EVERY 6 HOURS PRN
Status: DISCONTINUED | OUTPATIENT
Start: 2022-07-09 | End: 2022-07-09

## 2022-07-09 RX ORDER — NITROGLYCERIN 0.4 MG/1
0.4 TABLET SUBLINGUAL EVERY 5 MIN PRN
Status: DISCONTINUED | OUTPATIENT
Start: 2022-07-09 | End: 2022-07-09 | Stop reason: HOSPADM

## 2022-07-09 RX ORDER — LOSARTAN POTASSIUM 25 MG/1
25 TABLET ORAL DAILY
Status: DISCONTINUED | OUTPATIENT
Start: 2022-07-09 | End: 2022-07-09 | Stop reason: HOSPADM

## 2022-07-09 RX ORDER — ZOLPIDEM TARTRATE 5 MG/1
5 TABLET ORAL
Status: DISCONTINUED | OUTPATIENT
Start: 2022-07-09 | End: 2022-07-09 | Stop reason: HOSPADM

## 2022-07-09 RX ORDER — ACETAMINOPHEN 650 MG/1
650 SUPPOSITORY RECTAL EVERY 6 HOURS PRN
Status: DISCONTINUED | OUTPATIENT
Start: 2022-07-09 | End: 2022-07-09 | Stop reason: HOSPADM

## 2022-07-09 RX ORDER — NALOXONE HYDROCHLORIDE 0.4 MG/ML
0.2 INJECTION, SOLUTION INTRAMUSCULAR; INTRAVENOUS; SUBCUTANEOUS
Status: DISCONTINUED | OUTPATIENT
Start: 2022-07-09 | End: 2022-07-09 | Stop reason: HOSPADM

## 2022-07-09 RX ORDER — CALCIUM CARBONATE 500 MG/1
1000 TABLET, CHEWABLE ORAL 4 TIMES DAILY PRN
Status: DISCONTINUED | OUTPATIENT
Start: 2022-07-09 | End: 2022-07-09 | Stop reason: HOSPADM

## 2022-07-09 RX ORDER — SODIUM CHLORIDE 9 MG/ML
INJECTION, SOLUTION INTRAVENOUS CONTINUOUS
Status: DISCONTINUED | OUTPATIENT
Start: 2022-07-09 | End: 2022-07-09

## 2022-07-09 RX ORDER — ACETAMINOPHEN 325 MG/1
650 TABLET ORAL EVERY 6 HOURS PRN
Status: DISCONTINUED | OUTPATIENT
Start: 2022-07-09 | End: 2022-07-09 | Stop reason: HOSPADM

## 2022-07-09 RX ORDER — PROCHLORPERAZINE MALEATE 5 MG
10 TABLET ORAL EVERY 6 HOURS PRN
Status: DISCONTINUED | OUTPATIENT
Start: 2022-07-09 | End: 2022-07-09 | Stop reason: HOSPADM

## 2022-07-09 RX ORDER — DOCUSATE SODIUM 100 MG/1
100 CAPSULE, LIQUID FILLED ORAL 2 TIMES DAILY
Status: DISCONTINUED | OUTPATIENT
Start: 2022-07-09 | End: 2022-07-09 | Stop reason: HOSPADM

## 2022-07-09 RX ORDER — LIDOCAINE 40 MG/G
CREAM TOPICAL
Status: DISCONTINUED | OUTPATIENT
Start: 2022-07-09 | End: 2022-07-09

## 2022-07-09 RX ORDER — LORAZEPAM 0.5 MG/1
0.5 TABLET ORAL EVERY 6 HOURS PRN
Status: DISCONTINUED | OUTPATIENT
Start: 2022-07-09 | End: 2022-07-09 | Stop reason: HOSPADM

## 2022-07-09 RX ORDER — ATORVASTATIN CALCIUM 80 MG/1
80 TABLET, FILM COATED ORAL DAILY
Qty: 30 TABLET | Refills: 0 | Status: SHIPPED | OUTPATIENT
Start: 2022-07-09

## 2022-07-09 RX ORDER — CARVEDILOL 3.12 MG/1
3.12 TABLET ORAL 2 TIMES DAILY WITH MEALS
Qty: 1 TABLET | Refills: 0 | Status: SHIPPED | OUTPATIENT
Start: 2022-07-09

## 2022-07-09 RX ORDER — CARVEDILOL 3.12 MG/1
3.12 TABLET ORAL ONCE
Status: DISCONTINUED | OUTPATIENT
Start: 2022-07-09 | End: 2022-07-09 | Stop reason: HOSPADM

## 2022-07-09 RX ORDER — NITROGLYCERIN 0.4 MG/1
TABLET SUBLINGUAL
Qty: 30 TABLET | Refills: 0 | Status: SHIPPED | OUTPATIENT
Start: 2022-07-09

## 2022-07-09 RX ORDER — ASPIRIN 81 MG/1
81 TABLET, CHEWABLE ORAL DAILY
Qty: 30 TABLET | Refills: 0 | Status: SHIPPED | OUTPATIENT
Start: 2022-07-09

## 2022-07-09 RX ADMIN — ASPIRIN 81 MG: 81 TABLET, COATED ORAL at 09:32

## 2022-07-09 RX ADMIN — LOSARTAN POTASSIUM 25 MG: 25 TABLET, FILM COATED ORAL at 12:37

## 2022-07-09 RX ADMIN — TICAGRELOR 90 MG: 90 TABLET ORAL at 09:32

## 2022-07-09 RX ADMIN — CARVEDILOL 3.12 MG: 3.12 TABLET, FILM COATED ORAL at 09:32

## 2022-07-09 ASSESSMENT — ACTIVITIES OF DAILY LIVING (ADL)
ADLS_ACUITY_SCORE: 35
ADLS_ACUITY_SCORE: 35
PREVIOUS_RESPONSIBILITIES: MEAL PREP;HOUSEKEEPING;LAUNDRY;SHOPPING;YARDWORK;MEDICATION MANAGEMENT;FINANCES;DRIVING;WORK
ADLS_ACUITY_SCORE: 35

## 2022-07-09 NOTE — PROGRESS NOTES
A&Ox4. VS stable on RA. Tele NSR. Ind in room. Angio site WDL. Nitrogtt stopped. Met with cardiac rehab. AVS reviewed with pt. Pt discharged to home.

## 2022-07-09 NOTE — DISCHARGE SUMMARY
[facility]  Hospitalist Discharge Summary      Date of Admission:  7/8/2022  Date of Discharge:  7/9/2022  Discharging Provider: Kylah Govea MD, FACP  Discharge Service: Hospitalist Service    Discharge Diagnoses   NSTEMI, mid LAD lesion  S/p PCI  Newly diagnosed essential hypertension.  Dyslipidemia  Migraine headache.  Dysthymic disorder.  Insomnia.  Anxiety      Follow-ups Needed After Discharge   Follow-up Appointments     Follow-up and recommended labs and tests      Follow up with primary care provider, [facility], [place], within   7 days for hospital follow- up.  The following labs/tests are recommended:   BMP.  Follow-up with cardiology             Unresulted Labs Ordered in the Past 30 Days of this Admission     No orders found for last 31 day(s).          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  Here are further details regarding her current hospitalization.      NSTEMI (non-ST elevated myocardial infarction) (H) (7/8/2022):   As above, patient presented with 3-day intermittent chest pain in the left chest area, radiating to the arm, jaw and subscapular area, also noticed it with exertion when she walked yesterday, associated with some diaphoresis and nausea.  She thinks the longest episode was 5 minutes yesterday.  Due to worsening of symptoms patient presented to ER this morning where she required administration of baby aspirin, 2 doses of nitroglycerin and EKG was concerning for T wave inversions in leads III and 4..  Troponin  was elevated around 99 and cardiology was consulted in the emergency room.  Untreated hyperlipidemia  Essential hypertension: In the past patient has been a started on metoprolol which she was not able to tolerate due to worsening depression     --Patient was admitted to the medical floor with telemetry for close monitoring and was a started on aspirin, heparin infusion and low-dose Coreg along with a statin.  --Patient was evaluated by cardiology and was taken to the Cath Lab where she underwent angiogram and was found to have a severe mid LAD lesion and underwent 1 drug-eluting stent placement.  --Patient tolerated the procedure well, she was noticed to be significantly hypertensive during the hospitalization and required nitroglycerin infusion post cardiac cath.  --Patient cardiac medications were adjusted and she has been off the nitroglycerin since last night.  --Patient is planned to be discharged on aspirin 81 mg p.o. daily, carvedilol 3.125 mg p.o. twice daily(has not been able to tolerate metoprolol in the past) , Diovan 25 mg p.o. daily, Lipitor 80 mg p.o. daily.  She would also be given prescription of as needed's sublingual nitroglycerin  --As per patient history, in the past she has not been able to tolerate lisinopril due to the cough and was also not able to tolerate metoprolol due to worsening depression, it indicates that her blood pressure might have been high in the past also and probably her persistent high blood pressure during the hospitalization is secondary to essential hypertension rather than only anxiety.  --Echocardiogram was done showing normal LV systolic function of 50 to 55%, there is mid to distal anterior, anteroseptal and apical wall hypokinesis, there is right ventricle is normal in structure function and size.  There is a small bright mobile echodensity attached at the atrial aspect of the posterior mitral valve leaflet which most likely represent degenerative element although  vegetation cannot be completely excluded.  As patient does not have any signs and symptoms of infective endocarditis at this point, echo was also reviewed by cardiology and no further work-up is recommended at this point.  --Patient will be discharged to have a close follow-up with cardiology, PCP and outpatient cardiac rehab.      Migraine headache : stable   -- Held Adderrall while patient is in the hospital, resume on discharge      Dysthymic disorder  Insomnia  Situational anxiety  -- Patient has been taking Sonata 1 to 2 capsules by mouth as needed, ordered Ambien 5 mg as needed while patient is in the hospital.  --Patient was noticed to be significantly anxious during the hospitalization, stressful home situation due to brother being in accident, patient did require few dosages of IV Ativan administration during the stay  --Discussed with patient regarding addressing it with PCP and psychiatry as an outpatient if she continues to have significant anxiety after the discharge also    Patient was seen and examined on the day of discharge ,she is feeling well, does not have any complaints , I did review the discharge medications and instructions with the patient and plan for her to follow up with the PCP after the hospitalization .patient was in agreement , she is discharged in stable condition to her home with outpatient cardiac rehab     Consultations This Hospital Stay   PHARMACY IP CONSULT  PHARMACY IP CONSULT  NUTRITION SERVICES ADULT IP CONSULT  CARDIAC REHAB IP CONSULT  PHARMACY IP CONSULT  CARE MANAGEMENT / SOCIAL WORK IP CONSULT  PHYSICAL THERAPY ADULT IP CONSULT  OCCUPATIONAL THERAPY ADULT IP CONSULT  CARDIOLOGY IP CONSULT  SMOKING CESSATION PROGRAM IP CONSULT    Code Status   Full Code    Time Spent on this Encounter   Kylah MCCLENDON MD, personally saw the patient today and spent greater than 30 minutes discharging this patient.     Kylah Govea MD, [facility]  [facility]  [address]  [address], [address]  [place] [address]  Phone: [phone]  ______________________________________________________________________    Physical Exam   Vital Signs: Temp: 97.8  F (36.6  C) Temp src: Oral BP: (!) 171/78 Pulse: 76   Resp: 16 SpO2: 100 % O2 Device: None (Room air)    Weight: 148 lbs 0 oz    Physical Exam  Vitals and nursing note reviewed.   Constitutional:       Appearance: She is well-developed.   HENT:      Head: Normocephalic and atraumatic.   Eyes:      Conjunctiva/sclera: Conjunctivae normal.      Pupils: Pupils are equal, round, and reactive to light.   Neck:      Thyroid: No thyromegaly.   Cardiovascular:      Rate and Rhythm: Normal rate and regular rhythm.      Heart sounds: Normal heart sounds. No murmur heard.  Pulmonary:      Effort: Pulmonary effort is normal. No respiratory distress.      Breath sounds: Normal breath sounds. No wheezing.   Abdominal:      General: Bowel sounds are normal.      Palpations: Abdomen is soft.      Tenderness: There is no abdominal tenderness. There is no guarding or rebound.   Musculoskeletal:         General: No deformity. Normal range of motion.      Cervical back: Normal range of motion and neck supple.   Skin:     General: Skin is warm and dry.   Neurological:      Mental Status: She is alert and oriented to person, place, and time.   Psychiatric:         Behavior: Behavior normal.          Primary Care Physician   [facility]    Discharge Orders      CARDIAC REHAB REFERRAL      Follow-Up with Cardiology DOTTIE      Cardiac Rehab Referral      EKG 12-lead complete w/read  (to be scheduled)     Brief Discharge Instructions    Do NOT stop your aspirin or platelet inhibitor unless directed by your Cardiologist.  These medications help to prevent platelets in your blood from sticking together and forming a clot.  Examples of these medications are:  Ticagrelor (Brilinta), Clopidigrel (Plavix), Prasugrel (Effient)     When to call -  Contact the Heart Clinic    You may experience symptoms that require follow-up before your scheduled appointment. Contact the Heart Clinic if you develop: Fever over 100.4o Fahrenheit, that lasts more than one day; Redness, heat, or pus at the puncture site; Change in color or temperature in your groin or leg.     When to call - Reasons to Call 911    If your groin starts to bleed or begins to swell suddenly after leaving the hospital, lie flat and apply firm pressure just above the puncture site for 15 minutes.  If bleeding continues, call 9-1-1.     Precautions - Lifting    NO lifting of more than 10 pounds for at least 3 days.  If you usually lift 50 pounds or more daily, talk with your Cardiologist.     Precautions - Household Activities    Avoid any hard work or tiring activities.  NO physical activity such as mowing the lawn, raking, vacuuming, changing sheets on your bed, snow shoveling, or using a .     Precautions - Active Sports Activities    Avoid any tiring sports activities.  This includes, yard work, jogging, biking, bowling, swimming, tennis or golf, and sexual activity.     Precautions - Elective Dental Work    NO elective dental work for 6 weeks after receiving a stent.     Comfort and Pain Management - Pain after Surgery    Pain after surgery is normal and expected.  Your leg may be sore or stiff for a few days, and your pain will improve with time. You may take Tylenol or a pain medicine recommended by your Cardiologist.     Comfort and Pain Management - Bruising after Surgery    Bruising around the groin area is normal.  It may take 2-3 weeks for this to go away.  It is normal for the bruised area to turn green and/or yellow as it is healing.  A small lump may also be present and may last 2-3 months.     Activity - Daily Walking    During the day get up and walk around every 2 hours.     Activity - Light Household Activities    Light household activities are ok.     Activity - Elevate  Legs    Elevate legs in between all activities.     Activity - Cardiac Rehab    You are encouraged to enroll in an Outpatient Cardiac Rehab program after discharge from the hospital.  Our Cardiac Rehab staff may visit briefly with you while you're in the hospital.  If they miss you, someone will contact you after you are home.     Return to Driving    Driving is NOT permitted for 24 hours after surgery     Return to work    You may return to work after 72 hours if you are feeling well and your job does not involve heavy lifting.     Dressing Removal    You may take off the dressing on your groin the day after your procedure.     Incision Care    Keep the incision area dry and clean.  You do not need to use a bandage on your incision.     Shower / Bathing    It is ok to shower with regular soap. Pat dry, do not rub. No tub bath for 3 days. No swimming in a pool or hot tub immersion for 1 week     Reason for your hospital stay    You were admitted to the hospital secondary to non-ST segment elevation myocardial infarction and has undergone angiogram with  stent placement     Follow-up and recommended labs and tests    Follow up with primary care provider, [facility], within 7 days for hospital follow- up.  The following labs/tests are recommended: BMP.  Follow-up with cardiology     Activity    Your activity upon discharge: activity as tolerated and no driving for today     Discharge Instructions    You were admitted to the hospital secondary to NSTEMI.  You have undergone angiogram and received drug-eluting stent.  You were restarted on baby aspirin, carvedilol 3.125 mg p.o. twice daily, losartan 25 mg p.o. daily.  You are also started on Brilinta 90 mg p.o. twice daily which is very important to keep your stent open.  Please make sure that you stay compliant with all your medications especially Brilinta and aspirin.  You were also given tablets of nitroglycerin which you can use in case if you  develop chest pain.  Please keep your follow-up appointment with cardiology in outpatient cardiac rehab.     Diet    Follow this diet upon discharge: Orders Placed This Encounter      Combination Diet Regular Diet Adult         Significant Results and Procedures   Results for orders placed or performed during the hospital encounter of 22   Chest XR,  PA & LAT    Narrative    CHEST TWO VIEWS  2022 11:23 AM     HISTORY:  Chest pain.    COMPARISON: None.      Impression    IMPRESSION: Negative chest. Lungs clear.    BANDAR NAYLOR MD         SYSTEM ID:  [id number]   Echocardiogram Complete     Value    LVEF  50-55%    Narrative    [id number]  [id number]  [id number]  [id number]^CONCHITA^COURTNEY     [facility]  Echocardiography Laboratory  [address] [place] [address]     Name: SEEMA ROBERTSON  MRN: [id number]  : 1960  Study Date: 2022 04:02 PM  Age: 62 yrs  Gender: Female  Patient Location: [facility]  Reason For Study: CAD  Ordering Physician: COURTNEY ARANGO  Performed By: Anh Shah     BSA: 1.8 m2  Height: 63 in  Weight: 170 lb  HR: 63  BP: 188/98 mmHg  ______________________________________________________________________________  Procedure  Complete Portable Echo Adult. Optison (NDC #[id number]) given intravenously.  ______________________________________________________________________________  Interpretation Summary     1. Left ventricular systolic function is low normal. The visual ejection  fraction is 50-55%.  2. Mid to distal anterior, anteroseptal and apical wall hypokinesis is  present.  3. The right ventricle is normal in structure, function and size.  4. There is a small, bright mobile echodensity attached ot the atrial aspect  of the posterior mitral valve leaflet. It is near an area of mitral annular  calcification and likely represents a degenerative element. Vegetation cannot  be entirely excluded and clinical correlation is recommended.      ______________________________________________________________________________  Left Ventricle  The left ventricle is normal in size. There is normal left ventricular wall  thickness. The visual ejection fraction is 50-55%. Left ventricular systolic  function is low normal. Mid to distal anterior, anteroseptal and apical wall  hypokinesis is present.     Right Ventricle  The right ventricle is normal in structure, function and size.     Atria  Normal left atrial size. Right atrial size is normal. There is no color  Doppler evidence of an atrial shunt.     Mitral Valve  There is mild mitral annular calcification. There is a small, bright mobile  echodensity attached ot the atrial aspect of the posterior mitral valve  leaflet. It is near an area of mitral annular calcification and likely  represents degenerative element. Vegetation cannot be excluded and clinical  correlation is recommended. There is trace mitral regurgitation.     Tricuspid Valve  The tricuspid valve is normal in structure and function.     Aortic Valve  The aortic valve is trileaflet with aortic valve sclerosis.     Pulmonic Valve  The pulmonic valve is not well seen, but is grossly normal.     Vessels  Normal size aorta. IVC diameter <2.1 cm collapsing >50% with sniff suggests a  normal RA pressure of 3 mmHg.     Pericardium  There is no pericardial effusion.     Rhythm  Sinus rhythm was noted.  ______________________________________________________________________________  MMode/2D Measurements & Calculations     IVSd: 1.1 cm  LVIDd: 4.5 cm  LVIDs: 3.1 cm  LVPWd: 1.0 cm  FS: 30.8 %  LV mass(C)d: 166.1 grams  LV mass(C)dI: 92.1 grams/m2  Ao root diam: 2.8 cm  LA dimension: 3.6 cm  asc Aorta Diam: 2.8 cm  LA/Ao: 1.3  LVOT diam: 2.0 cm  LVOT area: 3.2 cm2  LA Volume (BP): 38.6 ml  LA Volume Index (BP): 21.4 ml/m2  RWT: 0.46     Doppler Measurements & Calculations  MV E max pedro: 68.4 cm/sec  MV A max pedro: 97.5 cm/sec  MV E/A: 0.70  MV dec slope:  275.5 cm/sec2  Ao V2 max: 176.8 cm/sec  Ao max P.0 mmHg  Ao V2 mean: 107.4 cm/sec  Ao mean P.4 mmHg  Ao V2 VTI: 35.9 cm  PA acc time: 0.08 sec  E/E' av.2  Lateral E/e': 8.6  Medial E/e': 13.8     ______________________________________________________________________________  Report approved by: Reuben Will 2022 04:54 PM         Cardiac Catheterization    Narrative      Prox RCA lesion is 40% stenosed.    Dist RCA lesion is 50% stenosed.    IVUS was performed on the lesion.    Ultrasound (IVUS) was performed.    Mid LM lesion is 30% stenosed.    Mid LAD-1 lesion is 90% stenosed.    Mid LAD-2 lesion is 30% stenosed.     NSTEMI (90% stenosis in mLAD)  One vessel obstructive CAD  IVUS guided successful PCI of mLAD with DESx1 (Synergy 3.00x20 mm),   post-dilated to 3.50 vessel.    Hemostasis of RFA with 6 Fr Angioseal               Discharge Medications   Current Discharge Medication List      START taking these medications    Details   aspirin (ASA) 81 MG chewable tablet Take 1 tablet (81 mg) by mouth daily Starting tomorrow.  Qty: 30 tablet, Refills: 0    Comments: Future refills by PCP Dr. Ellyn Cummins [place] Lyle with phone number [phone].  Associated Diagnoses: NSTEMI (non-ST elevated myocardial infarction) (H); Postsurgical percutaneous transluminal coronary angioplasty status      atorvastatin (LIPITOR) 80 MG tablet Take 1 tablet (80 mg) by mouth daily  Qty: 30 tablet, Refills: 0    Comments: Future refills by PCP Dr. Ellyn Cummins Kitter[place] Dariela with phone number [phone].  Associated Diagnoses: NSTEMI (non-ST elevated myocardial infarction) (H); Postsurgical percutaneous transluminal coronary angioplasty status      carvedilol (COREG) 3.125 MG tablet Take 1 tablet (3.125 mg) by mouth 2 times daily (with meals)  Qty: 1 tablet, Refills: 0    Associated Diagnoses: NSTEMI (non-ST elevated myocardial infarction) (H); Postsurgical percutaneous transluminal coronary angioplasty  status      losartan (COZAAR) 25 MG tablet Take 1 tablet (25 mg) by mouth daily  Qty: 30 tablet, Refills: 0    Comments: Future refills by PCP Dr. Ellyn Cummins [facility] with phone number [phone].  Associated Diagnoses: NSTEMI (non-ST elevated myocardial infarction) (H); Postsurgical percutaneous transluminal coronary angioplasty status      nitroGLYcerin (NITROSTAT) 0.4 MG sublingual tablet For chest pain place 1 tablet under the tongue every 5 minutes for 3 doses. If symptoms persist 5 minutes after 1st dose call 911.  Qty: 30 tablet, Refills: 0    Comments: Future refills by PCP Dr. Ellyn Cummins [place] Dariela with phone number [phone].  Associated Diagnoses: NSTEMI (non-ST elevated myocardial infarction) (H); Postsurgical percutaneous transluminal coronary angioplasty status      ticagrelor (BRILINTA) 90 MG tablet Take 1 tablet (90 mg) by mouth every 12 hours  Qty: 60 tablet, Refills: 0    Comments: Future refills by PCP Dr. Ellyn Cummins [place] Dariela with phone number [phone].  Associated Diagnoses: NSTEMI (non-ST elevated myocardial infarction) (H); Postsurgical percutaneous transluminal coronary angioplasty status         CONTINUE these medications which have NOT CHANGED    Details   amphetamine-dextroamphetamine (ADDERALL XR) 30 MG 24 hr capsule Take 1 capsule by mouth every morning      amphetamine-dextroamphetamine (ADDERALL) 10 MG tablet Take 1 tablet by mouth daily as needed      !! guanFACINE (INTUNIV) 1 MG TB24 24 hr tablet Take 1 tablet by mouth every evening Take in addition to 2 mg tablet      !! guanFACINE (INTUNIV) 2 MG TB24 24 hr tablet Take 1 tablet by mouth every evening Take in addition to 1 mg tablet      mometasone (ELOCON) 0.1 % external solution Apply topically daily as needed      !! NONFORMULARY Apply 0.25 g topically every morning Custom compound: 4 mg BiEst (80 Estriol:20 Estradiol) and 2 mg testosterone per gram of compound.   (1 mg BiEst + 0.5 mg testosterone per  dose)      !! NONFORMULARY Take 1 capsule by mouth At Bedtime Custom compound: 50 mg progesterone per capsule and olive oil      zaleplon (SONATA) 10 MG capsule Take 1-2 capsules by mouth nightly as needed       !! - Potential duplicate medications found. Please discuss with provider.        Allergies   Allergies   Allergen Reactions     Lisinopril Cough     Metoprolol Other (See Comments)

## 2022-07-09 NOTE — PROGRESS NOTES
"CARDIOLOGY PROGRESS NOTE  Medardo Braga MD (pager [phone])    62-year-old female admitted with chest discomfort and non-STEMI.  Cardiac catheterization yesterday showed severe mid LAD lesion.  Successful PCI with Synergy 3 x 20 mm ANJANA.    Echocardiography showed low normal LVEF 50-55% with LAD distribution WMA.  Normal RV.  Probable MAC.    Placed on aspirin and Brilinta.  Started on atorvastatin.  Low-dose carvedilol started.  Patient has intolerance of metoprolol.  -----------------------------------------------------------------    ASSESSMENT:  1. NSTEMI.  Severe mid LAD lesion with successful PCI, mild to moderate RCA disease.  2. Hypertension.  3. Dyslipidemia.    PLAN:  1. Aspirin, Brilinta, low-dose carvedilol, low-dose losartan (history of ACE cough), high-dose atorvastatin.  2. We will make arrangements for clinic follow-up in 2 weeks.  3. Outpatient cardiac rehab.  4. The patient can be discharged from our standpoint.    Total Time: 25 minutes;   13 minutes spent in direct communication with patient / family and care coordination.             Interval History:     no new complaints          Review of Systems:     CONSTITUTIONAL: NEGATIVE for fever, chills, change in weight  ENT/MOUTH: NEGATIVE for ear, mouth and throat problems  RESP: NEGATIVE for significant cough or SOB  CV: NEGATIVE for chest pain, palpitations or peripheral edema          Physical Exam:      Blood pressure (!) 148/104, pulse 74, temperature 97.8  F (36.6  C), temperature source Oral, resp. rate 16, height 1.6 m (5' 3\"), weight 67.1 kg (148 lb), SpO2 100 %.  Vitals:    07/08/22 0956 07/09/22 0600   Weight: 68 kg (150 lb) 67.1 kg (148 lb)     Vital Signs with Ranges  Temp:  [97.2  F (36.2  C)-98.2  F (36.8  C)] 97.8  F (36.6  C)  Pulse:  [] 74  Resp:  [15-19] 16  BP: (102-203)/() 148/104  SpO2:  [98 %-100 %] 100 %  I/O's Last 24 hours  I/O last 3 completed shifts:  In: 30 [I.V.:30]  Out: 800 [Urine:800]    GENERAL: Alert, " oriented, no distress  HEENT:  normocephalic, atraumatic.  NECK:  normal JVP.  LUNGS:  clear bilaterally, no wheezes  CARDIOVASCULAR:  RRR, no gallop, murmur or rub  ABDOMEN: soft, NT, no apparent hepatosplenomegaly  EXTREMITIES: Minor right groin ecchymosis  VASCULAR:  2+ carotids, 2+ radial pulses           Medications:          aspirin  81 mg Oral Daily     atorvastatin  80 mg Oral QPM     carvedilol  3.125 mg Oral BID w/meals     docusate sodium  100 mg Oral BID     lisinopril  5 mg Oral Daily     sodium chloride (PF)  3 mL Intracatheter Q8H     sodium chloride (PF)  3 mL Intracatheter Q8H     ticagrelor  90 mg Oral Q12H            Data:      All new lab and imaging data was reviewed.   Recent Labs   Lab Test 07/08/22  1005   WBC 7.0   HGB 13.4   MCV 94         Recent Labs   Lab Test 07/09/22  0750 07/09/22  0618 07/08/22  1005   NA  --  139 137   POTASSIUM 3.6 3.8 4.1   CHLORIDE  --  106 104   CO2  --  27 24   BUN  --  9 12   CR  --  0.53 0.59   ANIONGAP  --  6 9   CELESTINA  --  8.7 9.5   GLC  --  101* 124*     No lab results found.    Invalid input(s): TROP, TROPONINIES      EKG results:  Reviewed     Imaging:   Recent Results (from the past 24 hour(s))   Chest XR,  PA & LAT    Narrative    CHEST TWO VIEWS  7/8/2022 11:23 AM     HISTORY:  Chest pain.    COMPARISON: None.      Impression    IMPRESSION: Negative chest. Lungs clear.    BANDAR NAYLOR MD         SYSTEM ID:  [id number]   Cardiac Catheterization    Narrative      Prox RCA lesion is 40% stenosed.    Dist RCA lesion is 50% stenosed.    IVUS was performed on the lesion.    Ultrasound (IVUS) was performed.    Mid LM lesion is 30% stenosed.    Mid LAD-1 lesion is 90% stenosed.    Mid LAD-2 lesion is 30% stenosed.     NSTEMI (90% stenosis in mLAD)  One vessel obstructive CAD  IVUS guided successful PCI of mLAD with DESx1 (Synergy 3.00x20 mm),   post-dilated to 3.50 vessel.    Hemostasis of RFA with 6 Fr Angioseal         Echocardiogram Complete   Result  Value    LVEF  50-55%    [facility]    [id number]  [id number]  [id number]  [id number]^CONCHITA^COURTNEY     [facility]  Echocardiography Laboratory  [address] [place] [address]     Name: SEEMA ROBERTSON  MRN: [id number]  : 1960  Study Date: 2022 04:02 PM  Age: 62 yrs  Gender: Female  Patient Location: [facility]  Reason For Study: CAD  Ordering Physician: COURTNEY ARANGO  Performed By: Anh Shah     BSA: 1.8 m2  Height: 63 in  Weight: 170 lb  HR: 63  BP: 188/98 mmHg  ______________________________________________________________________________  Procedure  Complete Portable Echo Adult. Optison (NDC #[id number]) given intravenously.  ______________________________________________________________________________  Interpretation Summary     1. Left ventricular systolic function is low normal. The visual ejection  fraction is 50-55%.  2. Mid to distal anterior, anteroseptal and apical wall hypokinesis is  present.  3. The right ventricle is normal in structure, function and size.  4. There is a small, bright mobile echodensity attached ot the atrial aspect  of the posterior mitral valve leaflet. It is near an area of mitral annular  calcification and likely represents a degenerative element. Vegetation cannot  be entirely excluded and clinical correlation is recommended.     ______________________________________________________________________________  Left Ventricle  The left ventricle is normal in size. There is normal left ventricular wall  thickness. The visual ejection fraction is 50-55%. Left ventricular systolic  function is low normal. Mid to distal anterior, anteroseptal and apical wall  hypokinesis is present.     Right Ventricle  The right ventricle is normal in structure, function and size.     Atria  Normal left atrial size. Right atrial size is normal. There is no color  Doppler evidence of an atrial shunt.     Mitral Valve  There is mild mitral annular calcification. There  is a small, bright mobile  echodensity attached ot the atrial aspect of the posterior mitral valve  leaflet. It is near an area of mitral annular calcification and likely  represents degenerative element. Vegetation cannot be excluded and clinical  correlation is recommended. There is trace mitral regurgitation.     Tricuspid Valve  The tricuspid valve is normal in structure and function.     Aortic Valve  The aortic valve is trileaflet with aortic valve sclerosis.     Pulmonic Valve  The pulmonic valve is not well seen, but is grossly normal.     Vessels  Normal size aorta. IVC diameter <2.1 cm collapsing >50% with sniff suggests a  normal RA pressure of 3 mmHg.     Pericardium  There is no pericardial effusion.     Rhythm  Sinus rhythm was noted.  ______________________________________________________________________________  MMode/2D Measurements & Calculations     IVSd: 1.1 cm  LVIDd: 4.5 cm  LVIDs: 3.1 cm  LVPWd: 1.0 cm  FS: 30.8 %  LV mass(C)d: 166.1 grams  LV mass(C)dI: 92.1 grams/m2  Ao root diam: 2.8 cm  LA dimension: 3.6 cm  asc Aorta Diam: 2.8 cm  LA/Ao: 1.3  LVOT diam: 2.0 cm  LVOT area: 3.2 cm2  LA Volume (BP): 38.6 ml  LA Volume Index (BP): 21.4 ml/m2  RWT: 0.46     Doppler Measurements & Calculations  MV E max pedro: 68.4 cm/sec  MV A max pedro: 97.5 cm/sec  MV E/A: 0.70  MV dec slope: 275.5 cm/sec2  Ao V2 max: 176.8 cm/sec  Ao max P.0 mmHg  Ao V2 mean: 107.4 cm/sec  Ao mean P.4 mmHg  Ao V2 VTI: 35.9 cm  PA acc time: 0.08 sec  E/E' av.2  Lateral E/e': 8.6  Medial E/e': 13.8     ______________________________________________________________________________  Report approved by: Reuben Will 2022 04:54 PM

## 2022-07-09 NOTE — PLAN OF CARE
Goal Outcome Evaluation:  Patient alert, steady with ambulation. Right groin site wnl. Start nitroglycerin drip for BP control. Continued on 10mcg entire night with BP coming down from 160 systolic to 120's most of shift.IV site wnl. Pt very anxious and emotional. C/o difficulty with deep breathing. Recd order for IV lorazepam for anxiety, much improvement with one dose of 0.5mg, able to rest after. Tele SR, sat % on RA. Cardiac rehab today. Continue to monitor.

## 2022-07-09 NOTE — CONSULTS
Consult received for standard post-angio Heart Healthy diet teaching by RD.   - Will plan to follow up prior to discharge for education as schedule allows. Patient to follow up in cardiac rehab clinic after discharge for nutrition classes.
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
## 2022-07-09 NOTE — PLAN OF CARE
Physical Therapy: Orders received. Chart reviewed and discussed with care team.? Physical Therapy not indicated due to Pt being followed by OT/CR, is independent with all mobility and any endurance deficits will be addressed by OT/CR.? Defer discharge recommendations to OT/CR and medical team, anticipate discharge to home when medically stable.? Will complete orders.

## 2022-07-09 NOTE — DISCHARGE INSTRUCTIONS
Cardiac Angiogram Discharge Instructions - Femoral    After you go home:    Have an adult stay with you until tomorrow.  Drink extra fluids for 2 days.  You may resume your normal diet.  No smoking       For 24 hours - due to the sedation you received:  Relax and take it easy.  Do NOT make any important or legal decisions.  Do NOT drive or operate machines at home or at work.  Do NOT drink alcohol.    Care of Groin Puncture Site:    For the first 24 hrs - check the puncture site every 1-2 hours while awake.  For 2 days, when you cough, sneeze, laugh or move your bowels, hold your hand over the puncture site and press firmly.  Remove the bandaid after 24 hours. If there is minor oozing, apply another bandaid and remove it after 12 hours.  It is normal to have a small bruise or pea size lump at the site.  You may shower tomorrow. Do NOT take a bath, or use a hot tub or pool for at least 3 days. Do NOT scrub the site. Do not use lotion or powder near the puncture site.    Activity:            For 2 days:  No stooping or squatting  Do NOT do any heavy activity such as exercise, lifting, or straining.   No housework, yard work or any activity that make you sweat  Do NOT lift more than 10 pounds    Bleeding:    If you start bleeding from the site in your groin, lie down flat and press firmly on/above the site for 10 minutes.   Once bleeding stops, lay flat for 2 hours.   Call [facility] as soon as you can.       Call 911 right away if you have heavy bleeding or bleeding that does not stop.      Medicines:    If you are taking an antiplatelet medication such as Plavix, Brilinta or Effient, do not stop taking it until you talk to your cardiologist.    If you are on Metformin (Glucophage), do not restart it until you have blood tests (within 2 to 3 days after discharge).  After you have your blood drawn, you may restart the Metformin.   Take your medications, including blood thinners, unless your provider tells you not to.     If you take Coumadin (Warfarin), have your INR checked by your provider in  3-5 days. Call your clinic to schedule this.  If you have stopped any medicines, check with your provider about when to restart them.    Follow Up Appointments:    Follow up with [facility] Nurse Practitioner at [facility] of patient preference in 7-10 days.    Call the clinic if:    You have increased pain or a large or growing hard lump around the site.  The site is red, swollen, hot or tender.  Blood or fluid is draining from the site.  You have chills or a fever greater than 101 F (38 C).  Your leg feels numb, cool or changes color.  You have hives, a rash or unusual itching.  New pain in the back or belly that you cannot control with Tylenol.  Any questions or concerns.          [facility] [place]:    [phone] [facility] (7 days a week)

## 2022-07-11 ENCOUNTER — TELEPHONE (OUTPATIENT)
Dept: CARDIOLOGY | Facility: CLINIC | Age: 62
End: 2022-07-11

## 2022-07-11 NOTE — TELEPHONE ENCOUNTER
Patient was evaluated by cardiology while inpatient for chest pain-NSTEMI. PMH: untreated HLD, HTN, smoking, anxiety, insomnia, migraines. 7/8/22: Coronary angiogram via RFA showed one vessel obstructive CAD with subsequent IVUS guided successful PCI of mLAD with DESx1 (Synergy 3.00x20 mm), post-dilated to 3.50 vessel. Echo showed EF of 50-55% with LAD distribution WMA. Pt was started on ASA, Lipitor, NTG, Brilinta, Coreg and Cozaar at time of discharge. Writer attempted to call patient to discuss any post hospital d/c questions, review medication changes, and confirm f/u appts, but no answer. VM left to return my phone call. RN will confirm with patient that she was d/c with an adequate supply of the antiplatelet Brilinta, and reminded of importance of taking without interruption. Pt has an Rx for PRN SL Nitroglycerin. RN left reminder for pt that she needs to be scheduled for a cardiology DOTTIE OV and cardiac rehab. Scheduling phone number provided. XIN Crockett RN.

## 2022-07-12 LAB
ATRIAL RATE - MUSE: 60 BPM
DIASTOLIC BLOOD PRESSURE - MUSE: NORMAL MMHG
INTERPRETATION ECG - MUSE: NORMAL
P AXIS - MUSE: 44 DEGREES
PR INTERVAL - MUSE: 168 MS
QRS DURATION - MUSE: 90 MS
QT - MUSE: 482 MS
QTC - MUSE: 482 MS
R AXIS - MUSE: 74 DEGREES
SYSTOLIC BLOOD PRESSURE - MUSE: NORMAL MMHG
T AXIS - MUSE: 234 DEGREES
VENTRICULAR RATE- MUSE: 60 BPM

## 2022-07-12 NOTE — TELEPHONE ENCOUNTER
Second attempt at trying to contact pt, but again, no answer. VM left to return my phone call. Writer notes pt is now scheduled on 8/5/22 at 0850 to see DOTTIE Esthela Morillo at our [facility]. XIN Crockett RN.

## 2022-07-12 NOTE — TELEPHONE ENCOUNTER
Writer called back and she denies any chest pain or SOB. Does report some mild lightheadedness. Encouraged pt to make position changes slowly and fluids encouraged. RFA access site is without any c/o bleeding or swelling. Reviewed all medications and pt has no questions. States she received an adequate supply of each and reminded to take Brilinta every 12 hrs without interruption. Reviewed f/u OV as below. Cardiac rehab scheduling phone number provided. Pt verbalized understanding of all instructions without further questions. XIN Crockett RN.

## 2022-07-13 LAB
ATRIAL RATE - MUSE: 65 BPM
DIASTOLIC BLOOD PRESSURE - MUSE: NORMAL MMHG
INTERPRETATION ECG - MUSE: NORMAL
P AXIS - MUSE: 68 DEGREES
PR INTERVAL - MUSE: 164 MS
QRS DURATION - MUSE: 90 MS
QT - MUSE: 556 MS
QTC - MUSE: 578 MS
R AXIS - MUSE: 75 DEGREES
SYSTOLIC BLOOD PRESSURE - MUSE: NORMAL MMHG
T AXIS - MUSE: 171 DEGREES
VENTRICULAR RATE- MUSE: 65 BPM

## 2022-07-15 ENCOUNTER — HOSPITAL ENCOUNTER (EMERGENCY)
Facility: CLINIC | Age: 62
Discharge: HOME | End: 2022-07-15
Attending: EMERGENCY MEDICINE | Admitting: EMERGENCY MEDICINE
Payer: COMMERCIAL

## 2022-07-15 ENCOUNTER — NURSE TRIAGE (OUTPATIENT)
Dept: CARDIOLOGY | Facility: CLINIC | Age: 62
End: 2022-07-15

## 2022-07-15 ENCOUNTER — APPOINTMENT (OUTPATIENT)
Dept: GENERAL RADIOLOGY | Facility: CLINIC | Age: 62
End: 2022-07-15
Attending: EMERGENCY MEDICINE
Payer: COMMERCIAL

## 2022-07-15 VITALS
SYSTOLIC BLOOD PRESSURE: 167 MMHG | HEIGHT: 63 IN | DIASTOLIC BLOOD PRESSURE: 86 MMHG | TEMPERATURE: 97.9 F | HEART RATE: 67 BPM | RESPIRATION RATE: 8 BRPM | BODY MASS INDEX: 26.22 KG/M2 | OXYGEN SATURATION: 100 % | WEIGHT: 148 LBS

## 2022-07-15 DIAGNOSIS — I10 UNCONTROLLED HYPERTENSION: ICD-10-CM

## 2022-07-15 DIAGNOSIS — R06.00 DYSPNEA, UNSPECIFIED TYPE: ICD-10-CM

## 2022-07-15 DIAGNOSIS — R42 DIZZINESS: ICD-10-CM

## 2022-07-15 LAB
ANION GAP SERPL CALCULATED.3IONS-SCNC: 6 MMOL/L (ref 3–14)
ATRIAL RATE - MUSE: 67 BPM
BASOPHILS # BLD AUTO: 0.1 10E3/UL (ref 0–0.2)
BASOPHILS NFR BLD AUTO: 1 %
BUN SERPL-MCNC: 8 MG/DL (ref 7–30)
CALCIUM SERPL-MCNC: 9.7 MG/DL (ref 8.5–10.1)
CHLORIDE BLD-SCNC: 105 MMOL/L (ref 94–109)
CO2 SERPL-SCNC: 26 MMOL/L (ref 20–32)
CREAT SERPL-MCNC: 0.54 MG/DL (ref 0.52–1.04)
DIASTOLIC BLOOD PRESSURE - MUSE: NORMAL MMHG
EOSINOPHIL # BLD AUTO: 0.1 10E3/UL (ref 0–0.7)
EOSINOPHIL NFR BLD AUTO: 2 %
ERYTHROCYTE [DISTWIDTH] IN BLOOD BY AUTOMATED COUNT: 12.2 % (ref 10–15)
GFR SERPL CREATININE-BSD FRML MDRD: >90 ML/MIN/1.73M2
GLUCOSE BLD-MCNC: 98 MG/DL (ref 70–99)
HCT VFR BLD AUTO: 40.7 % (ref 35–47)
HGB BLD-MCNC: 13.8 G/DL (ref 11.7–15.7)
HOLD SPECIMEN: NORMAL
IMM GRANULOCYTES # BLD: 0 10E3/UL
IMM GRANULOCYTES NFR BLD: 1 %
INTERPRETATION ECG - MUSE: NORMAL
LYMPHOCYTES # BLD AUTO: 1.8 10E3/UL (ref 0.8–5.3)
LYMPHOCYTES NFR BLD AUTO: 30 %
MCH RBC QN AUTO: 31.5 PG (ref 26.5–33)
MCHC RBC AUTO-ENTMCNC: 33.9 G/DL (ref 31.5–36.5)
MCV RBC AUTO: 93 FL (ref 78–100)
MONOCYTES # BLD AUTO: 0.6 10E3/UL (ref 0–1.3)
MONOCYTES NFR BLD AUTO: 9 %
NEUTROPHILS # BLD AUTO: 3.5 10E3/UL (ref 1.6–8.3)
NEUTROPHILS NFR BLD AUTO: 57 %
NRBC # BLD AUTO: 0 10E3/UL
NRBC BLD AUTO-RTO: 0 /100
NT-PROBNP SERPL-MCNC: 135 PG/ML (ref 0–900)
P AXIS - MUSE: 0 DEGREES
PLATELET # BLD AUTO: 388 10E3/UL (ref 150–450)
POTASSIUM BLD-SCNC: 3.5 MMOL/L (ref 3.4–5.3)
PR INTERVAL - MUSE: 138 MS
QRS DURATION - MUSE: 96 MS
QT - MUSE: 440 MS
QTC - MUSE: 464 MS
R AXIS - MUSE: 70 DEGREES
RBC # BLD AUTO: 4.38 10E6/UL (ref 3.8–5.2)
SODIUM SERPL-SCNC: 137 MMOL/L (ref 133–144)
SYSTOLIC BLOOD PRESSURE - MUSE: NORMAL MMHG
T AXIS - MUSE: 185 DEGREES
TROPONIN I SERPL HS-MCNC: 10 NG/L
TROPONIN I SERPL HS-MCNC: 10 NG/L
VENTRICULAR RATE- MUSE: 67 BPM
WBC # BLD AUTO: 6 10E3/UL (ref 4–11)

## 2022-07-15 PROCEDURE — 36415 COLL VENOUS BLD VENIPUNCTURE: CPT | Performed by: EMERGENCY MEDICINE

## 2022-07-15 PROCEDURE — 99285 EMERGENCY DEPT VISIT HI MDM: CPT | Mod: 25

## 2022-07-15 PROCEDURE — 82310 ASSAY OF CALCIUM: CPT | Performed by: EMERGENCY MEDICINE

## 2022-07-15 PROCEDURE — 93005 ELECTROCARDIOGRAM TRACING: CPT

## 2022-07-15 PROCEDURE — 83880 ASSAY OF NATRIURETIC PEPTIDE: CPT | Performed by: EMERGENCY MEDICINE

## 2022-07-15 PROCEDURE — 71046 X-RAY EXAM CHEST 2 VIEWS: CPT

## 2022-07-15 PROCEDURE — 85025 COMPLETE CBC W/AUTO DIFF WBC: CPT | Performed by: EMERGENCY MEDICINE

## 2022-07-15 PROCEDURE — 84484 ASSAY OF TROPONIN QUANT: CPT | Mod: 91 | Performed by: EMERGENCY MEDICINE

## 2022-07-15 ASSESSMENT — ENCOUNTER SYMPTOMS
LIGHT-HEADEDNESS: 1
SHORTNESS OF BREATH: 1

## 2022-07-15 NOTE — TELEPHONE ENCOUNTER
"Warm transfer from Central Scheduling: patient states she has been SOB since yesterday, mostly a sensation that she needs to take a deep breath. Her BP at home is 180/100 and 170/90 with HR 76 BPM. Patient notes she has some anxiety issues and is very worried that she will have more heart issues.    She had an episode of sharp right-side chest discomfort yesterday that she thinks was GERD. It was similar to previous episodes in the past. She did take 1 NTG but did not think it made any difference. That episode was short-lived and did not return. She has no chest discomfort today.    Patient states she is going to have a friend take her to the ED today as she wants to be sure she is stable and not having another MI. 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    Patient was discharged 7/9/2022 after NSTEMI and new stent to LAD. HTN was newly diagnosed during admission. Patient was started on aspirin, brilinta, losartan,  Coreg and atorvastatin. She has been compliant with her medications.  Patient to see DOTTIE Esthela Morillo on 8/5/2022 for discharge review.    Will update cardiology team with today's call      1. RESPIRATORY STATUS: \"Describe your breathing?\" (e.g., wheezing, shortness of breath, unable to speak, severe coughing) SOB  2. ONSET: \"When did this breathing problem begin?\" yesterday  3. PATTERN \"Does the difficult breathing come and go, or has it been constant since it started?\" constnat  4. SEVERITY: \"How bad is your breathing?\" (e.g., mild, moderate, severe) ,moderate  \"feel like I need a deep breath all the time\"  - MILD: No SOB at rest, mild SOB with walking, speaks normally in sentences, can lay down, no retractions, pulse < 100.   - MODERATE: SOB at rest, SOB with minimal exertion and prefers to sit, cannot lie down flat, speaks in phrases, mild retractions, audible wheezing, pulse 100-120.   - SEVERE: Very SOB at rest, speaks in single words, struggling to breathe, sitting hunched forward, retractions, pulse > 120 " "  5. RECURRENT SYMPTOM: \"Have you had difficulty breathing before?\" If so, ask: \"When was the last time?\" and \"What happened that time?\"  Started yesterday and not getting relief  6. CARDIAC HISTORY: \"Do you have any history of heart disease?\" (e.g., heart attack, angina, bypass surgery, angioplasty) new NSTEMI and stent last week  7. LUNG HISTORY: \"Do you have any history of lung disease?\" (e.g., pulmonary embolus, asthma, emphysema)  8. CAUSE: \"What do you think is causing the breathing problem?\" mo  9. OTHER SYMPTOMS: \"Do you have any other symptoms? (e.g., dizziness, runny nose, cough, chest pain, fever) severely elevated BP    11. TRAVEL: \"Have you traveled out of the country in the last month?\" (e.g., travel history, exposures                "

## 2022-07-15 NOTE — DISCHARGE INSTRUCTIONS
Your EKG is improved from your last visit and 2 heart enzymes are negative.  At this time I would not make medication changes.  However, if you continue to have shortness of breath after taking the Brilinta, talk with cardiology about changing to Plavix.  Take all of your medications as instructed.  Your blood pressure is not well controlled today so they may need to make a dose adjustment when you follow-up with them. Do not check your blood pressure too often as elevated readings can cause increased anxiety which in turn raises the blood pressure further.  Please call cardiology first thing Monday morning to arrange follow-up before the 5th.  Let them know you were in the emergency department.  Return immediately if you have chest pain that does not go away with drinking water or is similar in any way to your last visit, worsening shortness of breath, fainting, fever, cough, or any other new or concerning symptoms.

## 2022-07-15 NOTE — ED TRIAGE NOTES
Pt presents with dizziness and SOB. Pt states last Friday she had one stent placed and was told she still has 90% occlusion in the LAD. Pt reports yesterday she began to feel dizzy and SOB. Pt states she denies chest pain at this time. She took BP at home last night which was also elevated.      Triage Assessment     Row Name 07/15/22 1248       Triage Assessment (Adult)    Airway WDL WDL       Respiratory WDL    Respiratory WDL X  Reports SOB, recent stent placed        Skin Circulation/Temperature WDL    Skin Circulation/Temperature WDL WDL       Cardiac WDL    Cardiac WDL WDL       Peripheral/Neurovascular WDL    Peripheral Neurovascular WDL WDL       Cognitive/Neuro/Behavioral WDL    Cognitive/Neuro/Behavioral WDL WDL

## 2022-07-18 ENCOUNTER — TELEPHONE (OUTPATIENT)
Dept: CARDIOLOGY | Facility: CLINIC | Age: 62
End: 2022-07-18

## 2022-07-18 NOTE — TELEPHONE ENCOUNTER
Follow up phone call to Inez, after she was in the ED on Friday 7\15\22.  They did not make any medication changes, but deferred to cardiology if we want to make any med adjustments for her high BP and shorness of breath.    Call went immediately to BELKIS.  Writer requested patient call us back to discuss.      7\9\22

## 2022-07-19 ASSESSMENT — ENCOUNTER SYMPTOMS
NAUSEA: 0
DIAPHORESIS: 0
VOMITING: 0

## 2022-07-19 NOTE — TELEPHONE ENCOUNTER
Patient called in to inform us that she will be transferring care to another cardiology clinic which she did not specify.    I thanks her for inna ing us.

## 2022-07-19 NOTE — TELEPHONE ENCOUNTER
7\19\22 Attempted again to contact Inez.  Phone went right to voicemail.  Writer requested call back to our nurse line phone.    Anne Fernandez RN on 7/19/2022 at 2:01 PM

## 2022-08-06 ENCOUNTER — HEALTH MAINTENANCE LETTER (OUTPATIENT)
Age: 62
End: 2022-08-06

## 2022-10-16 ENCOUNTER — HEALTH MAINTENANCE LETTER (OUTPATIENT)
Age: 62
End: 2022-10-16

## 2023-01-01 NOTE — ED PROVIDER NOTES
History   Chief Complaint:  Shortness of Breath     The history is provided by the patient.      Inez Benjamin is a 62 year old female with history of HTN and CAD s/p stent to LAD 7/8/22 on aspirin and Brilinta who presents with shortness of breath. This morning she went back to bed after taking her morning medications. When she awoke about 2.5 hours prior to arrival, she felt lightheaded and short of breath as if she needs to take deep breaths constantly. She had chest discomfort that is dissimilar to when she needed her stent and resolved with drinking water. She does admit to life stressors which may worsen her symptoms. She denies nausea, leg pain or swelling, or diaphoresis.    Of note, her BP at home was also elevated.     Review of Systems   Constitutional: Negative for diaphoresis.   Respiratory: Positive for shortness of breath.    Cardiovascular: Positive for chest pain. Negative for leg swelling.   Gastrointestinal: Negative for nausea and vomiting.   Neurological: Positive for light-headedness.   All other systems reviewed and are negative.    Allergies:  Lisinopril  Metoprolol    Medications:  Coreg   Brilinta   ASA  Adderall   Lipitor   Intuniv   Cozaar   Nitrostat   Sonata   Lexapro   Gabapentin   Ambien     Past Medical History:     NSTEMI (non-ST elevated myocardial infarction)   Migraine   Health maintenance   Dysthymic disorder  Personal history of tobacco use   Rosacea   Closed fracture of other bone of wrist   Depression   Cancer   Dysmenorrhea  Lipid disorder  Pneumonia   Fibroids   High blood pressure   Chickenpox     Past Surgical History:    Coronary angiogram   Percutaneous coronary intervention     Family History:    Alcoholism   Stroke     Social History:  The patient presents to the ED by herself. Friend later at bedside.  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  Brother is currently hospitalized and is getting a G tube today.    Physical Exam     Patient Vitals for the past 24 hrs:   BP Temp  "Temp src Pulse Resp SpO2 Height Weight   07/15/22 1400 (!) 167/86 -- -- 67 8 100 % -- --   07/15/22 1345 (!) 163/89 -- -- 68 9 100 % -- --   07/15/22 1330 (!) 177/80 -- -- 75 10 100 % -- --   07/15/22 1246 (!) 176/79 97.9  F (36.6  C) Oral 70 20 100 % 1.6 m (5' 3\") 67.1 kg (148 lb)     Orthostatics  Supine /84  HR 69 bpm  Sitting /94  HR 71 bpm  Standing /89  HR 71 bpm    Physical Exam    General: Well-developed and well-nourished. Well appearing middle aged  woman. Cooperative.  Head:  Atraumatic.  Eyes:  Conjunctivae, lids, and sclerae are normal.  Neck:  Supple. Normal range of motion.  CV:  Regular rate and rhythm. Normal heart sounds with no murmurs, rubs, or gallops detected.  Resp:  No respiratory distress. Clear to auscultation bilaterally without decreased breath sounds, wheezing, rales, or rhonchi.  GI:  Soft. Non-distended. Non-tender.    MS:  Normal ROM. No bilateral lower extremity edema.  Skin:  Warm. Non-diaphoretic. No pallor.  Neuro: Awake. A&Ox3. Normal strength.  Psych:  Normal mood and anxious affect. Normal speech.  Vitals reviewed.    Emergency Department Course     EKG  Indication: Shortness of breath  Time: 1410  Rate 67 bpm. NC interval 138. QRS duration 96. QT/QTc 440/464.   Normal sinus rhythm   ST & marked T wave abnormality, consider anterior ischemia  Abnormal ECG     Acute ST changes.  Anterior T wave inversions improved as compared to prior, dated 07/09/22.    Imaging:  XR Chest 2 Views   Final Result   IMPRESSION: There are no acute infiltrates. The cardiac silhouette is   not enlarged. Pulmonary vasculature is unremarkable.      LINCOLN FUNK MD            SYSTEM ID:  [id number]        Report per radiology    Laboratory:  Labs Ordered and Resulted from Time of ED Arrival to Time of ED Departure   BASIC METABOLIC PANEL - Normal       Result Value    Sodium 137      Potassium 3.5      Chloride 105      Carbon Dioxide (CO2) 26      Anion Gap 6      Urea " Nitrogen 8      Creatinine 0.54      Calcium 9.7      Glucose 98      GFR Estimate >90     TROPONIN I - Normal    Troponin I High Sensitivity 10     NT PROBNP INPATIENT - Normal    N terminal Pro BNP Inpatient 135     TROPONIN I - Normal    Troponin I High Sensitivity 10     CBC WITH PLATELETS AND DIFFERENTIAL    WBC Count 6.0      RBC Count 4.38      Hemoglobin 13.8      Hematocrit 40.7      MCV 93      MCH 31.5      MCHC 33.9      RDW 12.2      Platelet Count 388      % Neutrophils 57      % Lymphocytes 30      % Monocytes 9      % Eosinophils 2      % Basophils 1      % Immature Granulocytes 1      NRBCs per 100 WBC 0      Absolute Neutrophils 3.5      Absolute Lymphocytes 1.8      Absolute Monocytes 0.6      Absolute Eosinophils 0.1      Absolute Basophils 0.1      Absolute Immature Granulocytes 0.0      Absolute NRBCs 0.0          Emergency Department Course:  Reviewed:  I reviewed nursing notes, vitals, past medical history, and Care Everywhere.    Assessments:  2907 I obtained history and examined the patient as noted above.   1644 I rechecked the patient and explained findings.     Disposition:  Discharged    Impression & Plan   Medical Decision Making:  Inez is a 62 year old woman who had a stent to the LAD 1 week ago and is presenting with lightheadedness and shortness of breath since this morning.  She seems to believe her symptoms may be related to Brilinta which is a well-documented side effect.  However, she cannot give a good timeline of events to point to this as the cause of her dyspnea and she has not had issues in the last week when she has been on it.  I am hesitant to make any changes without input of cardiology or clear causal relationship.    On exam she does appear anxious and describes some life stressors.  This could certainly be contributing to her symptoms.  Otherwise she appears well.  Her EKG, while abnormal, is significantly improved with T wave inversions improving in the anterior  leads.  Orthostatics were obtained and were negative.  She was moderately hypertensive.  I obtained 2 EKGs with the second being greater than 2 hours after the first and greater than 4 hours after onset of symptoms and both were negative.  She does not appear volume overloaded and BNP is normal.  There is no leukocytosis, anemia, kidney injury, or electrolyte derangements.  Chest x-ray does not reveal alternate cause for her dyspnea such as pneumonia or pneumothorax. I doubt PE and she has no hypoxia or tachycardia.    At this point it is unclear what is causing her symptoms but is felt nonemergent.  I have strongly recommended she contact cardiology to discuss any changes to her medications including her antihypertensives or Brilinta.  She understands she needs to return immediately if she has change in pain, worsening dyspnea, or any other new or concerning symptoms.  All questions answered.  Amenable to discharge.    Diagnosis:    ICD-10-CM    1. Dyspnea, unspecified type  R06.00    2. Uncontrolled hypertension  I10    3. Dizziness  R42        Discharge Medications:  Discharge Medication List as of 7/15/2022  5:03 PM          Scribe Disclosure:  I, Jose Mcintyre, am serving as a scribe at 2:58 PM on 7/15/2022 to document services personally performed by Lizy Mireles MD based on my observations and the provider's statements to me.        Lizy Mireles MD  07/19/22 0414       Lizy Mireles MD  07/19/22 0936     3

## 2023-06-17 ENCOUNTER — HEALTH MAINTENANCE LETTER (OUTPATIENT)
Age: 63
End: 2023-06-17

## 2024-08-10 ENCOUNTER — HEALTH MAINTENANCE LETTER (OUTPATIENT)
Age: 64
End: 2024-08-10

## (undated) DEVICE — CATH ANGIO INFINITI 3DRC 4FRX100CM 538476

## (undated) DEVICE — INTRODUCER SHEATH GREEN 6.5FRX11CM .038IN PSI-6F-11-038ACT

## (undated) DEVICE — CATH BALLOON NC EMERGE 3.50X12MM H7493926712350

## (undated) DEVICE — KIT HAND CONTROL ANGIOTOUCH ACIST 65CM AT-P65

## (undated) DEVICE — INTRO SHEATH 4FRX10CM PINNACLE RSS402

## (undated) DEVICE — CATH ANGIO INFINITI JR4 4FRX100CM 538421

## (undated) DEVICE — DEFIB PRO-PADZ LVP LQD GEL ADULT 8900-2105-01

## (undated) DEVICE — CATH IVUS OPTICROSS HD 6 3.6FR 1.18MM DIA 135CML H7493935408

## (undated) DEVICE — TOTE ANGIO CORP PC15AT SAN32CC83O

## (undated) DEVICE — MANIFOLD KIT ANGIO AUTOMATED 014613

## (undated) DEVICE — CATH LAUNCHER 6FR EBU 3.5 LA6EBU35

## (undated) DEVICE — STENT CORONARY DES SYNERGY XD MR US 3.00X12MM H7493941812300: Type: IMPLANTABLE DEVICE | Status: NON-FUNCTIONAL

## (undated) DEVICE — CATH DIAG 4FR JL 4.5 538417

## (undated) DEVICE — NDL PERC ENTRY THINWALL 18GA 7.0" G00166

## (undated) DEVICE — GUIDEWIRE VASC 0.014INX190CM J TIP CGRXT190HJ

## (undated) DEVICE — INFL DVC KIT W/10CC NITRO IN4530

## (undated) RX ORDER — FENTANYL CITRATE 50 UG/ML
INJECTION, SOLUTION INTRAMUSCULAR; INTRAVENOUS
Status: DISPENSED
Start: 2022-07-08

## (undated) RX ORDER — HEPARIN SODIUM 1000 [USP'U]/ML
INJECTION, SOLUTION INTRAVENOUS; SUBCUTANEOUS
Status: DISPENSED
Start: 2022-07-08

## (undated) RX ORDER — LIDOCAINE HYDROCHLORIDE 10 MG/ML
INJECTION, SOLUTION EPIDURAL; INFILTRATION; INTRACAUDAL; PERINEURAL
Status: DISPENSED
Start: 2022-07-08

## (undated) RX ORDER — NITROGLYCERIN 5 MG/ML
VIAL (ML) INTRAVENOUS
Status: DISPENSED
Start: 2022-07-08

## (undated) RX ORDER — HEPARIN SODIUM 200 [USP'U]/100ML
INJECTION, SOLUTION INTRAVENOUS
Status: DISPENSED
Start: 2022-07-08